# Patient Record
Sex: MALE | Race: WHITE | NOT HISPANIC OR LATINO | Employment: OTHER | URBAN - METROPOLITAN AREA
[De-identification: names, ages, dates, MRNs, and addresses within clinical notes are randomized per-mention and may not be internally consistent; named-entity substitution may affect disease eponyms.]

---

## 2017-07-05 ENCOUNTER — HOSPITAL ENCOUNTER (OUTPATIENT)
Dept: CARDIOLOGY | Facility: HOSPITAL | Age: 82
Discharge: HOME | End: 2017-07-05
Attending: SPECIALIST
Payer: MEDICARE

## 2017-07-05 DIAGNOSIS — I45.10 RIGHT BUNDLE BRANCH BLOCK: ICD-10-CM

## 2017-07-05 PROCEDURE — 93306 TTE W/DOPPLER COMPLETE: CPT

## 2017-07-13 LAB
ESTIMATED PA SYSTOLIC PRESSURE: 28.6
MITRAL VALVE REGURGITATION: NORMAL
RETIRED EF AND QEF - SEE NOTES: 65 (ref 55–65)
TRICUSPID VALVE REGURGITATION: NORMAL

## 2018-02-23 ENCOUNTER — OFFICE VISIT (OUTPATIENT)
Dept: URGENT CARE | Facility: CLINIC | Age: 83
End: 2018-02-23
Payer: MEDICARE

## 2018-02-23 VITALS
TEMPERATURE: 99 F | SYSTOLIC BLOOD PRESSURE: 141 MMHG | OXYGEN SATURATION: 96 % | BODY MASS INDEX: 22.44 KG/M2 | HEART RATE: 64 BPM | HEIGHT: 67 IN | RESPIRATION RATE: 16 BRPM | WEIGHT: 143 LBS | DIASTOLIC BLOOD PRESSURE: 61 MMHG

## 2018-02-23 DIAGNOSIS — H60.391 OTITIS, EXTERNA, INFECTIVE, RIGHT: Primary | ICD-10-CM

## 2018-02-23 DIAGNOSIS — J30.9 ALLERGIC RHINITIS, UNSPECIFIED CHRONICITY, UNSPECIFIED SEASONALITY, UNSPECIFIED TRIGGER: ICD-10-CM

## 2018-02-23 PROCEDURE — 99203 OFFICE O/P NEW LOW 30 MIN: CPT | Mod: 25,S$GLB,, | Performed by: PHYSICIAN ASSISTANT

## 2018-02-23 PROCEDURE — 1159F MED LIST DOCD IN RCRD: CPT | Mod: S$GLB,,, | Performed by: PHYSICIAN ASSISTANT

## 2018-02-23 PROCEDURE — 3008F BODY MASS INDEX DOCD: CPT | Mod: S$GLB,,, | Performed by: PHYSICIAN ASSISTANT

## 2018-02-23 PROCEDURE — 96372 THER/PROPH/DIAG INJ SC/IM: CPT | Mod: S$GLB,,, | Performed by: FAMILY MEDICINE

## 2018-02-23 RX ORDER — BETAMETHASONE SODIUM PHOSPHATE AND BETAMETHASONE ACETATE 3; 3 MG/ML; MG/ML
6 INJECTION, SUSPENSION INTRA-ARTICULAR; INTRALESIONAL; INTRAMUSCULAR; SOFT TISSUE ONCE
Status: COMPLETED | OUTPATIENT
Start: 2018-02-23 | End: 2018-02-23

## 2018-02-23 RX ORDER — NEOMYCIN SULFATE, POLYMYXIN B SULFATE AND HYDROCORTISONE 10; 3.5; 1 MG/ML; MG/ML; [USP'U]/ML
4 SUSPENSION/ DROPS AURICULAR (OTIC) 3 TIMES DAILY
Qty: 10 ML | Refills: 0 | Status: SHIPPED | OUTPATIENT
Start: 2018-02-23 | End: 2018-03-02

## 2018-02-23 RX ADMIN — BETAMETHASONE SODIUM PHOSPHATE AND BETAMETHASONE ACETATE 6 MG: 3; 3 INJECTION, SUSPENSION INTRA-ARTICULAR; INTRALESIONAL; INTRAMUSCULAR; SOFT TISSUE at 08:02

## 2018-02-24 NOTE — PROGRESS NOTES
"Subjective:       Patient ID: Shan Warner is a 86 y.o. male.    Vitals:  height is 5' 7" (1.702 m) and weight is 64.9 kg (143 lb). His temperature is 98.7 °F (37.1 °C). His blood pressure is 141/61 (abnormal) and his pulse is 64. His respiration is 16 and oxygen saturation is 96%.     Chief Complaint: Otalgia (right ear pain)    Otalgia    There is pain in the right ear. The current episode started yesterday. The problem has been gradually worsening. There has been no fever. Pertinent negatives include no abdominal pain, coughing, diarrhea, ear discharge, headaches, neck pain, rash, sore throat or vomiting. His past medical history is significant for hearing loss (chronic/wears hearing aids).     Review of Systems   Constitution: Negative for chills, fever and malaise/fatigue.   HENT: Positive for congestion (with chronic post nasal drip for months/years. Constant throat clearing reported.) and ear pain (right ear only). Negative for ear discharge, hoarse voice and sore throat.    Eyes: Negative for blurred vision, discharge, pain, redness and visual disturbance.   Cardiovascular: Negative for chest pain, dyspnea on exertion, leg swelling, near-syncope and syncope.   Respiratory: Negative for cough, shortness of breath, sputum production and wheezing.    Hematologic/Lymphatic: Negative for adenopathy.   Skin: Negative for itching and rash.   Musculoskeletal: Negative for back pain, myalgias, neck pain and stiffness.   Gastrointestinal: Negative for abdominal pain, diarrhea, nausea and vomiting.   Neurological: Negative for dizziness, headaches, light-headedness and numbness.   Psychiatric/Behavioral: Negative for altered mental status.   Allergic/Immunologic: Negative for hives.   All other systems reviewed and are negative.      Objective:      Physical Exam   Constitutional: He is oriented to person, place, and time. He appears well-developed and well-nourished.  Non-toxic appearance. He does not have a " sickly appearance. He does not appear ill. No distress.   HENT:   Head: Normocephalic and atraumatic.   Right Ear: Tympanic membrane and external ear normal. No lacerations. There is swelling (ear canal with mild erythema) and tenderness (with movement of pinna and speculum insertion). No drainage. No foreign bodies. No mastoid tenderness. Tympanic membrane is not injected, not perforated, not erythematous and not bulging. No middle ear effusion. No hemotympanum. Decreased hearing (chronic hearing loss) is noted.   Left Ear: Tympanic membrane, external ear and ear canal normal.   Nose: Nose normal. No epistaxis. Right sinus exhibits no maxillary sinus tenderness and no frontal sinus tenderness. Left sinus exhibits no maxillary sinus tenderness and no frontal sinus tenderness.   Mouth/Throat: Uvula is midline, oropharynx is clear and moist and mucous membranes are normal. No uvula swelling.   Bilateral clear nasal congestion without erythema. Clear post nasal drip.    Eyes: Pupils are equal, round, and reactive to light.   Neck: Normal range of motion. Neck supple.   Cardiovascular: Normal rate, regular rhythm and normal heart sounds.  Exam reveals no gallop and no friction rub.    No murmur heard.  Pulmonary/Chest: Effort normal and breath sounds normal. No respiratory distress. He has no decreased breath sounds. He has no wheezes. He has no rhonchi. He has no rales.   Musculoskeletal: Normal range of motion.   Lymphadenopathy:        Head (right side): Posterior auricular adenopathy present. No submental, no submandibular, no tonsillar, no preauricular and no occipital adenopathy present.        Head (left side): No submental, no submandibular, no tonsillar, no preauricular, no posterior auricular and no occipital adenopathy present.     He has no cervical adenopathy.        Right cervical: No posterior cervical adenopathy present.       Left cervical: No posterior cervical adenopathy present.        Right: No  supraclavicular adenopathy present.        Left: No supraclavicular adenopathy present.   Neurological: He is alert and oriented to person, place, and time. He is not disoriented. Coordination and gait normal.   Skin: No abrasion, no ecchymosis, no laceration and no rash noted. No erythema.   Psychiatric: He has a normal mood and affect. His behavior is normal.   Nursing note and vitals reviewed.      Assessment:       1. Otitis, externa, infective, right    2. Allergic rhinitis, unspecified chronicity, unspecified seasonality, unspecified trigger        Plan:         Otitis, externa, infective, right  -     neomycin-polymyxin-hydrocortisone (CORTISPORIN) 3.5-10,000-1 mg/mL-unit/mL-% otic suspension; Place 4 drops into the right ear 3 (three) times daily.  Dispense: 10 mL; Refill: 0    Allergic rhinitis, unspecified chronicity, unspecified seasonality, unspecified trigger  -     betamethasone acetate-betamethasone sodium phosphate injection 6 mg; Inject 1 mL (6 mg total) into the muscle once.    - Please return here or go to the Emergency Department for any concerns or worsening of condition.   - Follow up with PCP and get referral for Allergist/ENT for allergy testing and treatment to help with chronic post nasal drip and throat clearing.   - If you were prescribed antibiotics, please take them to completion.  - Please follow up with your primary care provider (PCP) or discussed specialist(s) as needed.

## 2019-03-24 ENCOUNTER — OFFICE VISIT (OUTPATIENT)
Dept: URGENT CARE | Facility: CLINIC | Age: 84
End: 2019-03-24
Payer: MEDICARE

## 2019-03-24 VITALS
OXYGEN SATURATION: 97 % | TEMPERATURE: 99 F | HEART RATE: 69 BPM | SYSTOLIC BLOOD PRESSURE: 131 MMHG | RESPIRATION RATE: 16 BRPM | DIASTOLIC BLOOD PRESSURE: 56 MMHG

## 2019-03-24 DIAGNOSIS — R07.81 RIB PAIN ON LEFT SIDE: Primary | ICD-10-CM

## 2019-03-24 PROCEDURE — 99214 OFFICE O/P EST MOD 30 MIN: CPT | Mod: S$GLB,,, | Performed by: FAMILY MEDICINE

## 2019-03-24 PROCEDURE — 71101 X-RAY EXAM UNILAT RIBS/CHEST: CPT | Mod: LT,S$GLB,, | Performed by: RADIOLOGY

## 2019-03-24 PROCEDURE — 99214 PR OFFICE/OUTPT VISIT, EST, LEVL IV, 30-39 MIN: ICD-10-PCS | Mod: S$GLB,,, | Performed by: FAMILY MEDICINE

## 2019-03-24 PROCEDURE — 71101 XR RIBS MIN 3 VIEWS W/ PA CHEST LEFT: ICD-10-PCS | Mod: LT,S$GLB,, | Performed by: RADIOLOGY

## 2019-03-24 RX ORDER — VALACYCLOVIR HYDROCHLORIDE 1 G/1
1000 TABLET, FILM COATED ORAL 3 TIMES DAILY
Qty: 21 TABLET | Refills: 0 | Status: SHIPPED | OUTPATIENT
Start: 2019-03-24 | End: 2020-05-03 | Stop reason: CLARIF

## 2019-03-24 RX ORDER — BENZONATATE 100 MG/1
100 CAPSULE ORAL EVERY 6 HOURS PRN
Qty: 30 CAPSULE | Refills: 1 | Status: SHIPPED | OUTPATIENT
Start: 2019-03-24 | End: 2020-03-23

## 2019-03-24 NOTE — PROGRESS NOTES
Subjective:       Patient ID: Shan Warner is a 87 y.o. male.    Vitals:  oral temperature is 98.5 °F (36.9 °C). His blood pressure is 131/56 (abnormal) and his pulse is 69. His respiration is 16 and oxygen saturation is 97%.     Chief Complaint: Chest Pain    Pt c/o left side rib pain, started about 4 days ago, worsening, no known injury, applied heat with no relief     Chest Pain    This is a new problem. The current episode started in the past 7 days. The onset quality is sudden. The problem occurs constantly. The problem has been unchanged. Pertinent negatives include no cough, dizziness, fever, headaches, nausea, shortness of breath or vomiting.       Constitution: Negative for chills, fatigue and fever.   HENT: Negative for congestion and sore throat.    Neck: Negative for painful lymph nodes.   Cardiovascular: Positive for chest pain (Rib). Negative for leg swelling.   Eyes: Negative for double vision and blurred vision.   Respiratory: Negative for cough and shortness of breath.    Gastrointestinal: Negative for nausea, vomiting and diarrhea.   Genitourinary: Negative for dysuria, frequency and urgency.   Musculoskeletal: Negative for joint pain, joint swelling, muscle cramps and muscle ache.   Skin: Negative for color change, pale and rash.   Allergic/Immunologic: Negative for seasonal allergies.   Neurological: Negative for dizziness, history of vertigo, light-headedness, passing out and headaches.   Hematologic/Lymphatic: Negative for swollen lymph nodes, easy bruising/bleeding and history of blood clots. Does not bruise/bleed easily.   Psychiatric/Behavioral: Negative for nervous/anxious, sleep disturbance and depression. The patient is not nervous/anxious.        Objective:      Physical Exam   Constitutional: He is oriented to person, place, and time. He appears well-developed and well-nourished. He is cooperative.  Non-toxic appearance. He does not appear ill. No distress.   HENT:   Head:  Normocephalic and atraumatic.   Right Ear: Hearing, tympanic membrane, external ear and ear canal normal.   Left Ear: Hearing, tympanic membrane, external ear and ear canal normal.   Nose: Nose normal. No mucosal edema, rhinorrhea or nasal deformity. No epistaxis. Right sinus exhibits no maxillary sinus tenderness and no frontal sinus tenderness. Left sinus exhibits no maxillary sinus tenderness and no frontal sinus tenderness.   Mouth/Throat: Uvula is midline, oropharynx is clear and moist and mucous membranes are normal. No trismus in the jaw. Normal dentition. No uvula swelling. No posterior oropharyngeal erythema.   Eyes: Conjunctivae and lids are normal. No scleral icterus.   Sclera clear bilat   Neck: Trachea normal, full passive range of motion without pain and phonation normal. Neck supple.   Cardiovascular: Normal rate, regular rhythm, normal heart sounds, intact distal pulses and normal pulses.   Pulmonary/Chest: Effort normal and breath sounds normal. No respiratory distress. He exhibits bony tenderness.       Abdominal: Normal appearance. He exhibits no distension. There is no tenderness.   Musculoskeletal: Normal range of motion. He exhibits no edema or deformity.   Neurological: He is alert and oriented to person, place, and time. He exhibits normal muscle tone. Coordination normal.   Skin: Skin is warm, dry and intact. He is not diaphoretic. No pallor.   Psychiatric: He has a normal mood and affect. His speech is normal and behavior is normal. Judgment and thought content normal. Cognition and memory are normal.   Nursing note and vitals reviewed.      Assessment:       1. Rib pain on left side        Plan:         Rib pain on left side  -     XR RIB LEFT W/ PA CHEST; Future; Expected date: 03/24/2019        pt states worse with coughing. No rash seen but d/w possibility of shingles and s/s to be aware of.   Expectant mgmt given. OTC treatment.    Xr Rib Left W/ Pa Chest    Result Date:  3/24/2019  EXAMINATION: XR RIBS MIN 3 VIEWS W/ PA CHEST LEFT CLINICAL HISTORY: Pleurodynia TECHNIQUE: Four views of the left ribs with PA chest COMPARISON: 02/19/2018 chest x-ray FINDINGS: No left rib fracture identified.  The cardiomediastinal silhouette is with normal limits.  No consolidation, pleural effusion, or pneumothorax.  Lung volumes are moderately low similar to prior exam.  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  There has been a cholecystectomy     No left rib fracture identified.  No acute cardiopulmonary process. Electronically signed by: Jn Ferguson MD Date:    03/24/2019 Time:    15:04

## 2019-03-24 NOTE — LETTER
March 24, 2019      [facility] [place] [address], [address]  [facility] [address]  Phone: [phone]  Fax: [phone]       Patient: Shan Warner   YOB: 1932  Date of Visit: 03/24/2019    To Whom It May Concern:    Sven Warner  was at [facility] on 03/24/2019.Please excuse for work/school for 3 days unless well enough to return sooner  . If you have any questions or concerns, or if I can be of further assistance, please do not hesitate to contact me.    Sincerely,    Robb Minor MD

## 2019-03-27 ENCOUNTER — TELEPHONE (OUTPATIENT)
Dept: URGENT CARE | Facility: CLINIC | Age: 84
End: 2019-03-27

## 2020-05-03 PROBLEM — I10 ESSENTIAL HYPERTENSION: Status: ACTIVE | Noted: 2020-05-03

## 2020-05-03 PROBLEM — E11.9 TYPE 2 DIABETES MELLITUS WITHOUT COMPLICATION, WITH LONG-TERM CURRENT USE OF INSULIN: Status: ACTIVE | Noted: 2020-05-03

## 2020-05-03 PROBLEM — R31.9 HEMATURIA: Status: ACTIVE | Noted: 2020-05-03

## 2020-05-03 PROBLEM — R00.0 SINUS TACHYCARDIA: Status: ACTIVE | Noted: 2020-05-03

## 2020-05-03 PROBLEM — R31.0 GROSS HEMATURIA: Status: ACTIVE | Noted: 2020-05-03

## 2020-05-03 PROBLEM — Z79.4 TYPE 2 DIABETES MELLITUS WITHOUT COMPLICATION, WITH LONG-TERM CURRENT USE OF INSULIN: Status: ACTIVE | Noted: 2020-05-03

## 2020-05-03 PROBLEM — N20.0 NEPHROLITHIASIS: Status: ACTIVE | Noted: 2020-05-03

## 2020-05-05 PROBLEM — N41.0 ACUTE PROSTATITIS: Status: ACTIVE | Noted: 2020-05-05

## 2020-07-16 ENCOUNTER — LAB VISIT (OUTPATIENT)
Dept: LAB | Facility: HOSPITAL | Age: 85
End: 2020-07-16
Attending: INTERNAL MEDICINE
Payer: MEDICARE

## 2020-07-16 DIAGNOSIS — R31.9 HEMATURIA, UNSPECIFIED TYPE: ICD-10-CM

## 2020-07-16 DIAGNOSIS — N41.0 ACUTE PROSTATITIS: ICD-10-CM

## 2020-07-16 LAB
ALBUMIN SERPL BCP-MCNC: 3.8 G/DL (ref 3.5–5.2)
ALP SERPL-CCNC: 72 U/L (ref 55–135)
ALT SERPL W/O P-5'-P-CCNC: 11 U/L (ref 10–44)
ANION GAP SERPL CALC-SCNC: 8 MMOL/L (ref 8–16)
AST SERPL-CCNC: 23 U/L (ref 10–40)
BASOPHILS NFR BLD: 0 % (ref 0–1.9)
BILIRUB SERPL-MCNC: 0.5 MG/DL (ref 0.1–1)
BUN SERPL-MCNC: 21 MG/DL (ref 8–23)
CALCIUM SERPL-MCNC: 9.9 MG/DL (ref 8.7–10.5)
CHLORIDE SERPL-SCNC: 110 MMOL/L (ref 95–110)
CO2 SERPL-SCNC: 22 MMOL/L (ref 23–29)
CREAT SERPL-MCNC: 1.1 MG/DL (ref 0.5–1.4)
DIFFERENTIAL METHOD: ABNORMAL
EOSINOPHIL NFR BLD: 1 % (ref 0–8)
ERYTHROCYTE [DISTWIDTH] IN BLOOD BY AUTOMATED COUNT: 18.6 % (ref 11.5–14.5)
EST. GFR  (AFRICAN AMERICAN): >60 ML/MIN/1.73 M^2
EST. GFR  (NON AFRICAN AMERICAN): 59.6 ML/MIN/1.73 M^2
GLUCOSE SERPL-MCNC: 161 MG/DL (ref 70–110)
HCT VFR BLD AUTO: 33.9 % (ref 40–54)
HGB BLD-MCNC: 9.9 G/DL (ref 14–18)
IMM GRANULOCYTES # BLD AUTO: ABNORMAL K/UL (ref 0–0.04)
IMM GRANULOCYTES NFR BLD AUTO: ABNORMAL % (ref 0–0.5)
LYMPHOCYTES NFR BLD: 20 % (ref 18–48)
MCH RBC QN AUTO: 27 PG (ref 27–31)
MCHC RBC AUTO-ENTMCNC: 29.2 G/DL (ref 32–36)
MCV RBC AUTO: 92 FL (ref 82–98)
MONOCYTES NFR BLD: 15 % (ref 4–15)
NEUTROPHILS NFR BLD: 61 % (ref 38–73)
NEUTS BAND NFR BLD MANUAL: 3 %
NRBC BLD-RTO: 0 /100 WBC
PLATELET # BLD AUTO: 106 K/UL (ref 150–350)
PMV BLD AUTO: 12.7 FL (ref 9.2–12.9)
POTASSIUM SERPL-SCNC: 4.4 MMOL/L (ref 3.5–5.1)
PROT SERPL-MCNC: 7.4 G/DL (ref 6–8.4)
RBC # BLD AUTO: 3.67 M/UL (ref 4.6–6.2)
SODIUM SERPL-SCNC: 140 MMOL/L (ref 136–145)
WBC # BLD AUTO: 14.99 K/UL (ref 3.9–12.7)

## 2020-07-16 PROCEDURE — 85007 BL SMEAR W/DIFF WBC COUNT: CPT

## 2020-07-16 PROCEDURE — 80053 COMPREHEN METABOLIC PANEL: CPT

## 2020-07-16 PROCEDURE — 36415 COLL VENOUS BLD VENIPUNCTURE: CPT | Mod: PN

## 2020-07-16 PROCEDURE — 85027 COMPLETE CBC AUTOMATED: CPT

## 2020-07-17 ENCOUNTER — LAB VISIT (OUTPATIENT)
Dept: LAB | Facility: HOSPITAL | Age: 85
End: 2020-07-17
Attending: INTERNAL MEDICINE
Payer: MEDICARE

## 2020-07-17 DIAGNOSIS — I10 ESSENTIAL (PRIMARY) HYPERTENSION: ICD-10-CM

## 2020-07-17 DIAGNOSIS — E78.2 MIXED HYPERLIPIDEMIA: ICD-10-CM

## 2020-07-17 DIAGNOSIS — R80.9 PROTEINURIA, UNSPECIFIED: ICD-10-CM

## 2020-07-17 DIAGNOSIS — E11.40 DIABETIC NEUROPATHY: ICD-10-CM

## 2020-07-17 DIAGNOSIS — R53.81 OTHER MALAISE: ICD-10-CM

## 2020-07-17 DIAGNOSIS — E03.9 HYPOTHYROIDISM, UNSPECIFIED: Primary | ICD-10-CM

## 2020-07-17 DIAGNOSIS — E11.9 TYPE 2 DIABETES MELLITUS WITHOUT COMPLICATIONS: ICD-10-CM

## 2020-07-17 LAB
ALBUMIN SERPL BCP-MCNC: 3.8 G/DL (ref 3.5–5.2)
ALP SERPL-CCNC: 74 U/L (ref 55–135)
ALT SERPL W/O P-5'-P-CCNC: 11 U/L (ref 10–44)
ANION GAP SERPL CALC-SCNC: 10 MMOL/L (ref 8–16)
AST SERPL-CCNC: 23 U/L (ref 10–40)
BASOPHILS # BLD AUTO: ABNORMAL K/UL (ref 0–0.2)
BASOPHILS NFR BLD: 0 % (ref 0–1.9)
BILIRUB SERPL-MCNC: 0.5 MG/DL (ref 0.1–1)
BUN SERPL-MCNC: 22 MG/DL (ref 8–23)
CALCIUM SERPL-MCNC: 10 MG/DL (ref 8.7–10.5)
CHLORIDE SERPL-SCNC: 111 MMOL/L (ref 95–110)
CHOLEST SERPL-MCNC: 121 MG/DL (ref 120–199)
CHOLEST/HDLC SERPL: 4.7 {RATIO} (ref 2–5)
CO2 SERPL-SCNC: 21 MMOL/L (ref 23–29)
CREAT SERPL-MCNC: 1 MG/DL (ref 0.5–1.4)
DIFFERENTIAL METHOD: ABNORMAL
EOSINOPHIL # BLD AUTO: ABNORMAL K/UL (ref 0–0.5)
EOSINOPHIL NFR BLD: 0 % (ref 0–8)
ERYTHROCYTE [DISTWIDTH] IN BLOOD BY AUTOMATED COUNT: 18.8 % (ref 11.5–14.5)
EST. GFR  (AFRICAN AMERICAN): >60 ML/MIN/1.73 M^2
EST. GFR  (NON AFRICAN AMERICAN): >60 ML/MIN/1.73 M^2
GLUCOSE SERPL-MCNC: 166 MG/DL (ref 70–110)
HCT VFR BLD AUTO: 33.8 % (ref 40–54)
HDLC SERPL-MCNC: 26 MG/DL (ref 40–75)
HDLC SERPL: 21.5 % (ref 20–50)
HGB BLD-MCNC: 10.1 G/DL (ref 14–18)
IMM GRANULOCYTES # BLD AUTO: ABNORMAL K/UL (ref 0–0.04)
IMM GRANULOCYTES NFR BLD AUTO: ABNORMAL % (ref 0–0.5)
LDLC SERPL CALC-MCNC: 73.6 MG/DL (ref 63–159)
LYMPHOCYTES # BLD AUTO: ABNORMAL K/UL (ref 1–4.8)
LYMPHOCYTES NFR BLD: 16 % (ref 18–48)
MCH RBC QN AUTO: 27.3 PG (ref 27–31)
MCHC RBC AUTO-ENTMCNC: 29.9 G/DL (ref 32–36)
MCV RBC AUTO: 91 FL (ref 82–98)
MONOCYTES # BLD AUTO: ABNORMAL K/UL (ref 0.3–1)
MONOCYTES NFR BLD: 13 % (ref 4–15)
NEUTROPHILS NFR BLD: 71 % (ref 38–73)
NONHDLC SERPL-MCNC: 95 MG/DL
NRBC BLD-RTO: 0 /100 WBC
PLATELET # BLD AUTO: 99 K/UL (ref 150–350)
PLATELET BLD QL SMEAR: ABNORMAL
PMV BLD AUTO: 12 FL (ref 9.2–12.9)
POTASSIUM SERPL-SCNC: 4.6 MMOL/L (ref 3.5–5.1)
PROT SERPL-MCNC: 7.2 G/DL (ref 6–8.4)
RBC # BLD AUTO: 3.7 M/UL (ref 4.6–6.2)
SODIUM SERPL-SCNC: 142 MMOL/L (ref 136–145)
T3FREE SERPL-MCNC: 2.8 PG/ML (ref 2.3–4.2)
T4 FREE SERPL-MCNC: 0.78 NG/DL (ref 0.71–1.51)
TRIGL SERPL-MCNC: 107 MG/DL (ref 30–150)
TSH SERPL DL<=0.005 MIU/L-ACNC: 3.02 UIU/ML (ref 0.4–4)
WBC # BLD AUTO: 16.03 K/UL (ref 3.9–12.7)

## 2020-07-17 PROCEDURE — 36415 COLL VENOUS BLD VENIPUNCTURE: CPT | Mod: PN

## 2020-07-17 PROCEDURE — 85007 BL SMEAR W/DIFF WBC COUNT: CPT

## 2020-07-17 PROCEDURE — 80053 COMPREHEN METABOLIC PANEL: CPT

## 2020-07-17 PROCEDURE — 84443 ASSAY THYROID STIM HORMONE: CPT

## 2020-07-17 PROCEDURE — 84481 FREE ASSAY (FT-3): CPT

## 2020-07-17 PROCEDURE — 80061 LIPID PANEL: CPT

## 2020-07-17 PROCEDURE — 83036 HEMOGLOBIN GLYCOSYLATED A1C: CPT

## 2020-07-17 PROCEDURE — 85027 COMPLETE CBC AUTOMATED: CPT

## 2020-07-17 PROCEDURE — 84439 ASSAY OF FREE THYROXINE: CPT

## 2020-07-17 PROCEDURE — 82043 UR ALBUMIN QUANTITATIVE: CPT

## 2020-07-18 LAB
ALBUMIN/CREAT UR: 172.2 UG/MG (ref 0–30)
CREAT UR-MCNC: 54 MG/DL (ref 23–375)
ESTIMATED AVG GLUCOSE: 143 MG/DL (ref 68–131)
HBA1C MFR BLD HPLC: 6.6 % (ref 4–5.6)
MICROALBUMIN UR DL<=1MG/L-MCNC: 93 UG/ML

## 2020-08-12 ENCOUNTER — OFFICE VISIT (OUTPATIENT)
Dept: PODIATRY | Facility: CLINIC | Age: 85
End: 2020-08-12
Payer: MEDICARE

## 2020-08-12 VITALS — BODY MASS INDEX: 19.88 KG/M2 | HEIGHT: 68 IN | WEIGHT: 131.19 LBS

## 2020-08-12 DIAGNOSIS — E11.49 TYPE II DIABETES MELLITUS WITH NEUROLOGICAL MANIFESTATIONS: Primary | ICD-10-CM

## 2020-08-12 DIAGNOSIS — B35.1 ONYCHOMYCOSIS DUE TO DERMATOPHYTE: ICD-10-CM

## 2020-08-12 PROCEDURE — 99203 OFFICE O/P NEW LOW 30 MIN: CPT | Mod: S$GLB,,, | Performed by: PODIATRIST

## 2020-08-12 PROCEDURE — 1126F AMNT PAIN NOTED NONE PRSNT: CPT | Mod: S$GLB,,, | Performed by: PODIATRIST

## 2020-08-12 PROCEDURE — 1126F PR PAIN SEVERITY QUANTIFIED, NO PAIN PRESENT: ICD-10-PCS | Mod: S$GLB,,, | Performed by: PODIATRIST

## 2020-08-12 PROCEDURE — 99999 PR PBB SHADOW E&M-EST. PATIENT-LVL IV: CPT | Mod: PBBFAC,,, | Performed by: PODIATRIST

## 2020-08-12 PROCEDURE — 1101F PR PT FALLS ASSESS DOC 0-1 FALLS W/OUT INJ PAST YR: ICD-10-PCS | Mod: CPTII,S$GLB,, | Performed by: PODIATRIST

## 2020-08-12 PROCEDURE — 1101F PT FALLS ASSESS-DOCD LE1/YR: CPT | Mod: CPTII,S$GLB,, | Performed by: PODIATRIST

## 2020-08-12 PROCEDURE — 99999 PR PBB SHADOW E&M-EST. PATIENT-LVL IV: ICD-10-PCS | Mod: PBBFAC,,, | Performed by: PODIATRIST

## 2020-08-12 PROCEDURE — 1159F MED LIST DOCD IN RCRD: CPT | Mod: S$GLB,,, | Performed by: PODIATRIST

## 2020-08-12 PROCEDURE — 1159F PR MEDICATION LIST DOCUMENTED IN MEDICAL RECORD: ICD-10-PCS | Mod: S$GLB,,, | Performed by: PODIATRIST

## 2020-08-12 PROCEDURE — 99203 PR OFFICE/OUTPT VISIT, NEW, LEVL III, 30-44 MIN: ICD-10-PCS | Mod: S$GLB,,, | Performed by: PODIATRIST

## 2020-08-12 RX ORDER — ACETAMINOPHEN, DIPHENHYDRAMINE HCL, PHENYLEPHRINE HCL 325; 25; 5 MG/1; MG/1; MG/1
1 TABLET ORAL DAILY
COMMUNITY
End: 2023-01-26 | Stop reason: CLARIF

## 2020-08-12 NOTE — PROGRESS NOTES
"Subjective:      Patient ID: Shan Warner is a 88 y.o. male.    Chief Complaint: Diabetic Foot Exam (PCP:  Dr Mari  July 2020; HgbA1c:  7/17/20  6.6) and Nail Problem ("fungus")    Shan TELLEZ is a 88 y.o. male who presents to the clinic upon referral from Dr. Sweet  for evaluation and treatment of diabetic feet. Shan TELLEZ has a past medical history of Arthritis, Dementia, Diabetes mellitus, Essential hypertension (5/3/2020), HEARING LOSS, Leukemia, and Rash. Patient relates no major problem with feet. Only complaints today consist of thick and discolored nails has tried some topical medications but seems to help a bit but comes right back when he stops using them..    PCP: Isma Mari MD    Date Last Seen by PCP: July 2020    Current shoe gear: Casual shoes    Hemoglobin A1C   Date Value Ref Range Status   07/17/2020 6.6 (H) 4.0 - 5.6 % Final     Comment:     ADA Screening Guidelines:  5.7-6.4%  Consistent with prediabetes  >or=6.5%  Consistent with diabetes  High levels of fetal hemoglobin interfere with the HbA1C  assay. Heterozygous hemoglobin variants (HbS, HgC, etc)do  not significantly interfere with this assay.   However, presence of multiple variants may affect accuracy.     08/14/2018 6.5 (H) 0.0 - 5.6 % Final     Comment:     Reference Interval:  5.0 - 5.6 Normal   5.7 - 6.4 High Risk   > 6.5 Diabetic    Hgb A1c results are standardized based on the (NGSP) National   Glycohemoglobin Standardization Program.    Hemoglobin A1C levels are related to mean serum/plasma glucose   during the preceding 2-3 months.                Review of Systems   Constitution: Negative for chills and fever.   Cardiovascular: Negative for claudication and leg swelling.   Respiratory: Negative for shortness of breath.    Skin: Positive for nail changes. Negative for itching and rash.   Musculoskeletal: Positive for arthritis. Negative for muscle cramps, muscle weakness and myalgias.   Gastrointestinal: Negative for " nausea and vomiting.   Neurological: Positive for paresthesias. Negative for focal weakness, loss of balance and numbness.           Objective:      Physical Exam  Constitutional:       General: He is not in acute distress.     Appearance: He is well-developed. He is not diaphoretic.   Cardiovascular:      Pulses:           Dorsalis pedis pulses are 2+ on the right side and 2+ on the left side.        Posterior tibial pulses are 2+ on the right side and 2+ on the left side.      Comments: < 3 sec capillary refill time to toes 1-5 bilateral. Toes and feet are warm to touch proximally with normal distal cooling b/l. There is some hair growth on the feet and toes b/l. There is no edema b/l. No spider veins or varicosities present b/l.     Musculoskeletal:      Comments: Equinus noted b/l ankles with < 10 deg DF noted. MMT 5/5 in DF/PF/Inv/Ev resistance with no reproduction of pain in any direction. Passive range of motion of ankle and pedal joints is painless b/l.     Feet:      Right foot:      Protective Sensation: 10 sites tested. 4 sites sensed.      Toenail Condition: Right toenails are abnormally thick and long. Fungal disease present.     Left foot:      Protective Sensation: 10 sites tested. 6 sites sensed.      Toenail Condition: Left toenails are abnormally thick and long. Fungal disease present.     Comments: Nails 1-5 bilateral are thick 3-4 mm, long 3-6 mm, and discolored with subungual debris    Skin:     General: Skin is warm and dry.      Coloration: Skin is not pale.      Findings: No abrasion, bruising, burn, ecchymosis, erythema, laceration, lesion, petechiae or rash.      Nails: There is no clubbing.        Comments: Skin temperature, texture and turgor within normal limits.   Neurological:      Mental Status: He is alert and oriented to person, place, and time.      Sensory: Sensory deficit present.      Motor: No tremor, atrophy or abnormal muscle tone.      Comments: Negative tinel sign  bilateral. Absent vibratory sensation bilateral   Psychiatric:         Behavior: Behavior normal.               Assessment:       Encounter Diagnoses   Name Primary?    Type II diabetes mellitus with neurological manifestations Yes    Onychomycosis due to dermatophyte          Plan:       Shan TELLEZ was seen today for diabetic foot exam and nail problem.    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    - Shoe inspection. Diabetic Foot Education. Patient reminded of the importance of good nutrition and blood sugar control to help prevent podiatric complications of diabetes. Patient instructed on proper foot hygeine. We discussed wearing proper shoe gear, daily foot inspections, never walking without protective shoe gear.    - With patient's permission and out of courtesy, nails were aggressively reduced and debrided x 10 to their soft tissue attachment mechanically and with electric , removing all offending nail and debris. Patient relates relief following the procedure. He will continue to monitor the areas daily, inspect his feet, wear protective shoe gear when ambulatory, moisturizer to maintain skin integrity and follow in this office in approximately 2-3 months, sooner p.r.n.      Prescribed CMPD Voriconazole 2.67%, Vancomycin 6.67% Nail Suspension in DMSO, apply twice daily to affected nails    Return in 1 year diabetic foot check up

## 2020-08-13 ENCOUNTER — TELEPHONE (OUTPATIENT)
Dept: PODIATRY | Facility: CLINIC | Age: 85
End: 2020-08-13

## 2020-08-13 NOTE — TELEPHONE ENCOUNTER
----- Message from Rafa Schmitz sent at 8/13/2020  8:57 AM CDT -----  Type:  Pharmacy Calling to Clarify an RX    Name of Caller:  Kaela  Pharmacy Name:    [facility]      Prescription Name:  Compound--Voriconazole/Vancomycin  What do they need to clarify?:  Not covered by patient's insurance-Needs an alternative  Best Call Back Number:  [phone]  Additional Information:

## 2020-08-13 NOTE — TELEPHONE ENCOUNTER
Wants to know if the RX can be changed to a powder - Doxy/cipro/ketoconazole mix .  Stated the other is not covered under his insurance.  Please advise.

## 2020-09-03 ENCOUNTER — LAB VISIT (OUTPATIENT)
Dept: LAB | Facility: HOSPITAL | Age: 85
End: 2020-09-03
Attending: SURGERY
Payer: MEDICARE

## 2020-09-03 DIAGNOSIS — I43 DILATED CARDIOMYOPATHY SECONDARY TO ELECTROLYTE DEFICIENCY: ICD-10-CM

## 2020-09-03 DIAGNOSIS — I10 ESSENTIAL HYPERTENSION, MALIGNANT: ICD-10-CM

## 2020-09-03 DIAGNOSIS — I51.9 MYXEDEMA HEART DISEASE: ICD-10-CM

## 2020-09-03 DIAGNOSIS — E87.8 DILATED CARDIOMYOPATHY SECONDARY TO ELECTROLYTE DEFICIENCY: ICD-10-CM

## 2020-09-03 DIAGNOSIS — E03.9 MYXEDEMA HEART DISEASE: ICD-10-CM

## 2020-09-03 DIAGNOSIS — K21.00 REFLUX ESOPHAGITIS: ICD-10-CM

## 2020-09-03 DIAGNOSIS — M19.90 SENILE ARTHRITIS: Primary | ICD-10-CM

## 2020-09-03 DIAGNOSIS — I25.9 CHRONIC ISCHEMIC HEART DISEASE, UNSPECIFIED: ICD-10-CM

## 2020-09-03 DIAGNOSIS — E87.5 HYPERPOTASSEMIA: ICD-10-CM

## 2020-09-03 LAB
ALBUMIN SERPL BCP-MCNC: 3.9 G/DL (ref 3.5–5.2)
ALBUMIN SERPL BCP-MCNC: 3.9 G/DL (ref 3.5–5.2)
ALP SERPL-CCNC: 74 U/L (ref 55–135)
ALP SERPL-CCNC: 74 U/L (ref 55–135)
ALT SERPL W/O P-5'-P-CCNC: 12 U/L (ref 10–44)
ALT SERPL W/O P-5'-P-CCNC: 12 U/L (ref 10–44)
ANION GAP SERPL CALC-SCNC: 9 MMOL/L (ref 8–16)
AST SERPL-CCNC: 20 U/L (ref 10–40)
AST SERPL-CCNC: 20 U/L (ref 10–40)
BILIRUB DIRECT SERPL-MCNC: 0.3 MG/DL (ref 0.1–0.3)
BILIRUB SERPL-MCNC: 0.6 MG/DL (ref 0.1–1)
BILIRUB SERPL-MCNC: 0.6 MG/DL (ref 0.1–1)
BUN SERPL-MCNC: 21 MG/DL (ref 8–23)
CALCIUM SERPL-MCNC: 9.8 MG/DL (ref 8.7–10.5)
CHLORIDE SERPL-SCNC: 111 MMOL/L (ref 95–110)
CHOLEST SERPL-MCNC: 113 MG/DL (ref 120–199)
CHOLEST/HDLC SERPL: 4.5 {RATIO} (ref 2–5)
CO2 SERPL-SCNC: 21 MMOL/L (ref 23–29)
COMPLEXED PSA SERPL-MCNC: 1.7 NG/ML (ref 0–4)
CREAT SERPL-MCNC: 1.1 MG/DL (ref 0.5–1.4)
EST. GFR  (AFRICAN AMERICAN): >60 ML/MIN/1.73 M^2
EST. GFR  (NON AFRICAN AMERICAN): 59.6 ML/MIN/1.73 M^2
ESTIMATED AVG GLUCOSE: 134 MG/DL (ref 68–131)
GLUCOSE SERPL-MCNC: 171 MG/DL (ref 70–110)
HBA1C MFR BLD HPLC: 6.3 % (ref 4–5.6)
HDLC SERPL-MCNC: 25 MG/DL (ref 40–75)
HDLC SERPL: 22.1 % (ref 20–50)
LDLC SERPL CALC-MCNC: 62.4 MG/DL (ref 63–159)
NONHDLC SERPL-MCNC: 88 MG/DL
POTASSIUM SERPL-SCNC: 4.1 MMOL/L (ref 3.5–5.1)
PROT SERPL-MCNC: 7.1 G/DL (ref 6–8.4)
PROT SERPL-MCNC: 7.1 G/DL (ref 6–8.4)
SODIUM SERPL-SCNC: 141 MMOL/L (ref 136–145)
T4 FREE SERPL-MCNC: 0.8 NG/DL (ref 0.71–1.51)
TRIGL SERPL-MCNC: 128 MG/DL (ref 30–150)
TSH SERPL DL<=0.005 MIU/L-ACNC: 3.13 UIU/ML (ref 0.4–4)
URATE SERPL-MCNC: 4.4 MG/DL (ref 3.4–7)

## 2020-09-03 PROCEDURE — 36415 COLL VENOUS BLD VENIPUNCTURE: CPT | Mod: PN

## 2020-09-03 PROCEDURE — 86038 ANTINUCLEAR ANTIBODIES: CPT

## 2020-09-03 PROCEDURE — 85027 COMPLETE CBC AUTOMATED: CPT

## 2020-09-03 PROCEDURE — 80053 COMPREHEN METABOLIC PANEL: CPT

## 2020-09-03 PROCEDURE — 86431 RHEUMATOID FACTOR QUANT: CPT

## 2020-09-03 PROCEDURE — 84153 ASSAY OF PSA TOTAL: CPT

## 2020-09-03 PROCEDURE — 80061 LIPID PANEL: CPT

## 2020-09-03 PROCEDURE — 83036 HEMOGLOBIN GLYCOSYLATED A1C: CPT

## 2020-09-03 PROCEDURE — 84550 ASSAY OF BLOOD/URIC ACID: CPT

## 2020-09-03 PROCEDURE — 84443 ASSAY THYROID STIM HORMONE: CPT

## 2020-09-03 PROCEDURE — 84439 ASSAY OF FREE THYROXINE: CPT

## 2020-09-03 PROCEDURE — 85007 BL SMEAR W/DIFF WBC COUNT: CPT

## 2020-09-04 LAB
ANISOCYTOSIS BLD QL SMEAR: SLIGHT
BASOPHILS # BLD AUTO: ABNORMAL K/UL (ref 0–0.2)
BASOPHILS NFR BLD: 0 % (ref 0–1.9)
BURR CELLS BLD QL SMEAR: ABNORMAL
DIFFERENTIAL METHOD: ABNORMAL
EOSINOPHIL # BLD AUTO: ABNORMAL K/UL (ref 0–0.5)
EOSINOPHIL NFR BLD: 3 % (ref 0–8)
ERYTHROCYTE [DISTWIDTH] IN BLOOD BY AUTOMATED COUNT: 19 % (ref 11.5–14.5)
GIANT PLATELETS BLD QL SMEAR: PRESENT
HCT VFR BLD AUTO: 36.1 % (ref 40–54)
HGB BLD-MCNC: 11 G/DL (ref 14–18)
HYPOCHROMIA BLD QL SMEAR: ABNORMAL
IMM GRANULOCYTES # BLD AUTO: ABNORMAL K/UL (ref 0–0.04)
IMM GRANULOCYTES NFR BLD AUTO: ABNORMAL % (ref 0–0.5)
LYMPHOCYTES # BLD AUTO: ABNORMAL K/UL (ref 1–4.8)
LYMPHOCYTES NFR BLD: 13 % (ref 18–48)
MCH RBC QN AUTO: 28.8 PG (ref 27–31)
MCHC RBC AUTO-ENTMCNC: 30.5 G/DL (ref 32–36)
MCV RBC AUTO: 95 FL (ref 82–98)
METAMYELOCYTES NFR BLD MANUAL: 6 %
MONOCYTES # BLD AUTO: ABNORMAL K/UL (ref 0.3–1)
MONOCYTES NFR BLD: 16 % (ref 4–15)
MYELOCYTES NFR BLD MANUAL: 1 %
NEUTROPHILS NFR BLD: 56 % (ref 38–73)
NEUTS BAND NFR BLD MANUAL: 5 %
NRBC BLD-RTO: 0 /100 WBC
OVALOCYTES BLD QL SMEAR: ABNORMAL
PLATELET # BLD AUTO: 98 K/UL (ref 150–350)
PLATELET BLD QL SMEAR: ABNORMAL
PMV BLD AUTO: 12.4 FL (ref 9.2–12.9)
POIKILOCYTOSIS BLD QL SMEAR: SLIGHT
POLYCHROMASIA BLD QL SMEAR: ABNORMAL
RBC # BLD AUTO: 3.82 M/UL (ref 4.6–6.2)
RHEUMATOID FACT SERPL-ACNC: <10 IU/ML (ref 0–15)
WBC # BLD AUTO: 13.36 K/UL (ref 3.9–12.7)

## 2020-09-08 LAB — ANA SER QL IF: NORMAL

## 2021-03-12 ENCOUNTER — OFFICE VISIT (OUTPATIENT)
Dept: PODIATRY | Facility: CLINIC | Age: 86
End: 2021-03-12
Payer: OTHER GOVERNMENT

## 2021-03-12 VITALS
HEART RATE: 90 BPM | SYSTOLIC BLOOD PRESSURE: 116 MMHG | BODY MASS INDEX: 19.7 KG/M2 | OXYGEN SATURATION: 99 % | WEIGHT: 130 LBS | HEIGHT: 68 IN | RESPIRATION RATE: 16 BRPM | DIASTOLIC BLOOD PRESSURE: 70 MMHG

## 2021-03-12 DIAGNOSIS — L97.511 ULCER OF RIGHT FOOT, LIMITED TO BREAKDOWN OF SKIN: ICD-10-CM

## 2021-03-12 DIAGNOSIS — E11.49 TYPE II DIABETES MELLITUS WITH NEUROLOGICAL MANIFESTATIONS: Primary | ICD-10-CM

## 2021-03-12 DIAGNOSIS — B35.1 ONYCHOMYCOSIS DUE TO DERMATOPHYTE: ICD-10-CM

## 2021-03-12 PROCEDURE — 99213 PR OFFICE/OUTPT VISIT, EST, LEVL III, 20-29 MIN: ICD-10-PCS | Mod: S$GLB,,, | Performed by: PODIATRIST

## 2021-03-12 PROCEDURE — 99213 OFFICE O/P EST LOW 20 MIN: CPT | Mod: S$GLB,,, | Performed by: PODIATRIST

## 2021-03-12 RX ORDER — INSULIN GLARGINE 100 [IU]/ML
10 INJECTION, SOLUTION SUBCUTANEOUS NIGHTLY
COMMUNITY
End: 2021-04-24 | Stop reason: CLARIF

## 2021-03-12 RX ORDER — INSULIN LISPRO 100 [IU]/ML
INJECTION, SOLUTION INTRAVENOUS; SUBCUTANEOUS
COMMUNITY
End: 2021-04-24 | Stop reason: CLARIF

## 2021-03-19 ENCOUNTER — TELEPHONE (OUTPATIENT)
Dept: FAMILY MEDICINE | Facility: CLINIC | Age: 86
End: 2021-03-19

## 2021-04-24 PROBLEM — K62.5 RECTAL BLEEDING: Status: ACTIVE | Noted: 2021-04-24

## 2021-04-28 PROBLEM — K62.5 RECTAL BLEEDING: Status: RESOLVED | Noted: 2021-04-24 | Resolved: 2021-04-28

## 2021-04-28 PROBLEM — D64.9 SYMPTOMATIC ANEMIA: Status: ACTIVE | Noted: 2021-04-28

## 2021-05-10 ENCOUNTER — PATIENT MESSAGE (OUTPATIENT)
Dept: RESEARCH | Facility: HOSPITAL | Age: 86
End: 2021-05-10

## 2022-01-03 ENCOUNTER — TELEPHONE (OUTPATIENT)
Dept: CARDIOLOGY | Facility: HOSPITAL | Age: 87
End: 2022-01-03
Payer: OTHER GOVERNMENT

## 2022-01-03 ENCOUNTER — DOCUMENTATION ONLY (OUTPATIENT)
Dept: CARDIOLOGY | Facility: HOSPITAL | Age: 87
End: 2022-01-03
Payer: OTHER GOVERNMENT

## 2022-01-03 ENCOUNTER — TELEPHONE (OUTPATIENT)
Dept: CARDIOLOGY | Facility: CLINIC | Age: 87
End: 2022-01-03
Payer: OTHER GOVERNMENT

## 2022-01-03 DIAGNOSIS — Z95.0 CARDIAC PACEMAKER IN SITU: Primary | ICD-10-CM

## 2022-01-03 DIAGNOSIS — I44.2 AV BLOCK, COMPLETE: ICD-10-CM

## 2022-01-03 NOTE — TELEPHONE ENCOUNTER
Spoke with pt's wife.  She agreed on date and time for pt f/u in clinic.  Pt not sure if he is going to be following here or with the pt's wife's cardiologist.  Pt will let us know during f/u appt.

## 2022-01-03 NOTE — TELEPHONE ENCOUNTER
----- Message from Matilde Mac sent at 1/3/2022  8:13 AM CST -----    ----- Message -----  From: Isaiah Villagomez MD  Sent: 1/1/2022  11:28 AM CST  To: Matilde Cooper just put in this device On 12/31/21, please enroll in device clinic

## 2022-01-03 NOTE — TELEPHONE ENCOUNTER
SPOKE W/ PT, HE IS SCHEDULED TO COME IN 1/10/22    ----- Message from Radha Calixto LPN sent at 1/3/2022  9:45 AM CST -----    ----- Message -----  From: Isaiah Villagomez MD  Sent: 1/1/2022  11:28 AM CST  To: Jasmin BENJAMIN Staff    Please arrange follow up with Dr. Cooper at patient's request    -Isaiah

## 2022-01-04 ENCOUNTER — TELEPHONE (OUTPATIENT)
Dept: CARDIOLOGY | Facility: CLINIC | Age: 87
End: 2022-01-04
Payer: OTHER GOVERNMENT

## 2022-01-04 NOTE — TELEPHONE ENCOUNTER
----- Message from Rafa Schmitz sent at 1/4/2022  3:13 PM CST -----  Type: Needs Medical Advice  Who Called:  Grace Warner (Spouse  Symptoms (please be specific):  Dark stool-Possible blood  How long has patient had these symptoms:  Since surgery    Best Call Back Number: [phone]  Additional Information: Please call to advise

## 2022-01-04 NOTE — TELEPHONE ENCOUNTER
Spoke to wife and advised to contact PCP re dark stools and high blood sugars. Pt will be seen by Dr Cooper on 1/10 for SOB

## 2022-01-05 ENCOUNTER — HOSPITAL ENCOUNTER (OUTPATIENT)
Dept: RADIOLOGY | Facility: HOSPITAL | Age: 87
Discharge: HOME | End: 2022-01-05
Attending: INTERNAL MEDICINE
Payer: MEDICARE

## 2022-01-05 ENCOUNTER — OFFICE VISIT (OUTPATIENT)
Dept: CARDIOLOGY | Facility: CLINIC | Age: 87
End: 2022-01-05
Payer: MEDICARE

## 2022-01-05 ENCOUNTER — DOCUMENTATION ONLY (OUTPATIENT)
Dept: CARDIOLOGY | Facility: HOSPITAL | Age: 87
End: 2022-01-05
Payer: OTHER GOVERNMENT

## 2022-01-05 VITALS
HEART RATE: 98 BPM | HEIGHT: 66 IN | DIASTOLIC BLOOD PRESSURE: 59 MMHG | WEIGHT: 134.5 LBS | SYSTOLIC BLOOD PRESSURE: 118 MMHG | BODY MASS INDEX: 21.62 KG/M2

## 2022-01-05 DIAGNOSIS — E08.22 DIABETES MELLITUS DUE TO UNDERLYING CONDITION WITH DIABETIC CHRONIC KIDNEY DISEASE: ICD-10-CM

## 2022-01-05 DIAGNOSIS — I10 ESSENTIAL HYPERTENSION: ICD-10-CM

## 2022-01-05 DIAGNOSIS — R06.02 SOB (SHORTNESS OF BREATH): ICD-10-CM

## 2022-01-05 DIAGNOSIS — I44.2 AV BLOCK, COMPLETE: ICD-10-CM

## 2022-01-05 DIAGNOSIS — I10 ESSENTIAL HYPERTENSION: Primary | ICD-10-CM

## 2022-01-05 PROCEDURE — 99999 PR PBB SHADOW E&M-EST. PATIENT-LVL V: ICD-10-PCS | Mod: PBBFAC,,, | Performed by: INTERNAL MEDICINE

## 2022-01-05 PROCEDURE — 71046 X-RAY EXAM CHEST 2 VIEWS: CPT | Mod: 26,,, | Performed by: RADIOLOGY

## 2022-01-05 PROCEDURE — 1159F MED LIST DOCD IN RCRD: CPT | Mod: CPTII,S$GLB,, | Performed by: INTERNAL MEDICINE

## 2022-01-05 PROCEDURE — 99024 PR POST-OP FOLLOW-UP VISIT: ICD-10-PCS | Mod: S$GLB,,, | Performed by: INTERNAL MEDICINE

## 2022-01-05 PROCEDURE — 99024 POSTOP FOLLOW-UP VISIT: CPT | Mod: S$GLB,,, | Performed by: INTERNAL MEDICINE

## 2022-01-05 PROCEDURE — 1111F PR DISCHARGE MEDS RECONCILED W/ CURRENT OUTPATIENT MED LIST: ICD-10-PCS | Mod: CPTII,S$GLB,, | Performed by: INTERNAL MEDICINE

## 2022-01-05 PROCEDURE — 71046 XR CHEST PA AND LATERAL: ICD-10-PCS | Mod: 26,,, | Performed by: RADIOLOGY

## 2022-01-05 PROCEDURE — 1160F RVW MEDS BY RX/DR IN RCRD: CPT | Mod: CPTII,S$GLB,, | Performed by: INTERNAL MEDICINE

## 2022-01-05 PROCEDURE — 1126F PR PAIN SEVERITY QUANTIFIED, NO PAIN PRESENT: ICD-10-PCS | Mod: CPTII,S$GLB,, | Performed by: INTERNAL MEDICINE

## 2022-01-05 PROCEDURE — 1160F PR REVIEW ALL MEDS BY PRESCRIBER/CLIN PHARMACIST DOCUMENTED: ICD-10-PCS | Mod: CPTII,S$GLB,, | Performed by: INTERNAL MEDICINE

## 2022-01-05 PROCEDURE — 1126F AMNT PAIN NOTED NONE PRSNT: CPT | Mod: CPTII,S$GLB,, | Performed by: INTERNAL MEDICINE

## 2022-01-05 PROCEDURE — 1159F PR MEDICATION LIST DOCUMENTED IN MEDICAL RECORD: ICD-10-PCS | Mod: CPTII,S$GLB,, | Performed by: INTERNAL MEDICINE

## 2022-01-05 PROCEDURE — 71046 X-RAY EXAM CHEST 2 VIEWS: CPT | Mod: TC,FY,PO

## 2022-01-05 PROCEDURE — 99999 PR PBB SHADOW E&M-EST. PATIENT-LVL V: CPT | Mod: PBBFAC,,, | Performed by: INTERNAL MEDICINE

## 2022-01-05 PROCEDURE — 1111F DSCHRG MED/CURRENT MED MERGE: CPT | Mod: CPTII,S$GLB,, | Performed by: INTERNAL MEDICINE

## 2022-01-05 NOTE — PROGRESS NOTES
Wound assessed per Dr. Castellanos request  Implant 12/31/21  Incision WNL, No s/s of infection, staples intact, minimal swelling noted, bruising noted  Cleansed with wound cleanser, applied guaze and surgical tape  Reviewed wound care and arm restrictions  VU  Appt scheduled 1/10/22 for device f/u post implant

## 2022-01-05 NOTE — PROGRESS NOTES
Subjective:    Patient ID:  Shan Warner is a 89 y.o. male who presents for follow-up of Shortness of Breath and Edema      HPI  Had PPM last week due to advanced heart block, near syncope and SOB  He comes for follow up with mild left hand swelling, persistent SOB with minimal activity, no chest pain  Blood sugars all over the place      Review of Systems   Constitutional: Negative for decreased appetite, malaise/fatigue, weight gain and weight loss.   Cardiovascular: Positive for dyspnea on exertion. Negative for chest pain, leg swelling, palpitations and syncope.   Respiratory: Positive for shortness of breath. Negative for cough.    Gastrointestinal: Negative.    Neurological: Negative for weakness.   All other systems reviewed and are negative.       Objective:      Physical Exam  Vitals and nursing note reviewed.   Constitutional:       Appearance: Normal appearance. He is well-developed and well-nourished.   HENT:      Head: Normocephalic.   Eyes:      Pupils: Pupils are equal, round, and reactive to light.   Neck:      Thyroid: No thyromegaly.      Vascular: No carotid bruit or JVD.   Cardiovascular:      Rate and Rhythm: Normal rate and regular rhythm.      Chest Wall: PMI is not displaced.      Pulses: Normal pulses and intact distal pulses.      Heart sounds: Normal heart sounds. No murmur heard.  No gallop.    Pulmonary:      Effort: Pulmonary effort is normal.      Breath sounds: Normal breath sounds.   Chest:       Abdominal:      Palpations: Abdomen is soft. There is no hepatosplenomegaly or mass.      Tenderness: There is no abdominal tenderness.   Musculoskeletal:         General: No edema. Normal range of motion.      Cervical back: Normal range of motion and neck supple.   Skin:     General: Skin is warm and intact.   Neurological:      Mental Status: He is alert and oriented to person, place, and time.      Sensory: No sensory deficit.      Deep Tendon Reflexes: Strength normal and reflexes  are normal and symmetric.   Psychiatric:         Mood and Affect: Mood and affect normal.           Assessment:       1. Essential hypertension    2. AV block, complete    3. SOB (shortness of breath)    4. 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

## 2022-01-06 ENCOUNTER — TELEPHONE (OUTPATIENT)
Dept: CARDIOLOGY | Facility: CLINIC | Age: 87
End: 2022-01-06
Payer: OTHER GOVERNMENT

## 2022-01-06 NOTE — TELEPHONE ENCOUNTER
LABS AND CXR FAXED TO [facility] @ [phone] / PT REQUEST    ----- Message from Rupa Pérez sent at 1/6/2022  8:36 AM CST -----  Type: Needs Medical Advice  Who Called:  Pt wife  Best Call Back Number: [phone]  Additional Information: Pt wife requesting pt to be on wait list if possible-pt wife also requesting [facility]--please advise--Thank you

## 2022-01-10 ENCOUNTER — CLINICAL SUPPORT (OUTPATIENT)
Dept: CARDIOLOGY | Facility: HOSPITAL | Age: 87
End: 2022-01-10
Attending: INTERNAL MEDICINE
Payer: MEDICARE

## 2022-01-10 DIAGNOSIS — Z95.0 CARDIAC PACEMAKER IN SITU: ICD-10-CM

## 2022-01-10 DIAGNOSIS — R06.02 SOB (SHORTNESS OF BREATH): ICD-10-CM

## 2022-01-10 DIAGNOSIS — I44.2 AV BLOCK, COMPLETE: ICD-10-CM

## 2022-01-10 DIAGNOSIS — I10 ESSENTIAL HYPERTENSION: ICD-10-CM

## 2022-01-10 PROCEDURE — 93971 EXTREMITY STUDY: CPT | Mod: PO,LT

## 2022-01-10 PROCEDURE — 93971 CV US DOPPLER VENOUS ARM LEFT (CUPID ONLY): ICD-10-PCS | Mod: 26,LT,, | Performed by: INTERNAL MEDICINE

## 2022-01-10 PROCEDURE — 93971 EXTREMITY STUDY: CPT | Mod: 26,LT,, | Performed by: INTERNAL MEDICINE

## 2022-01-12 ENCOUNTER — PATIENT MESSAGE (OUTPATIENT)
Dept: FAMILY MEDICINE | Facility: CLINIC | Age: 87
End: 2022-01-12
Payer: OTHER GOVERNMENT

## 2022-01-13 ENCOUNTER — PATIENT MESSAGE (OUTPATIENT)
Dept: CARDIOLOGY | Facility: CLINIC | Age: 87
End: 2022-01-13
Payer: OTHER GOVERNMENT

## 2022-01-13 ENCOUNTER — TELEPHONE (OUTPATIENT)
Dept: CARDIOLOGY | Facility: HOSPITAL | Age: 87
End: 2022-01-13
Payer: OTHER GOVERNMENT

## 2022-01-13 NOTE — TELEPHONE ENCOUNTER
Spoke to wife and notified it looks like bruising. Instructed to notify clinic if increases in size or any symptoms of infection to notify clinic. Spouse verbalized understanding.

## 2022-01-13 NOTE — TELEPHONE ENCOUNTER
Called and spoke to spouse, she reports swelling at the pacemaker site and brown around the ends. Denies any s&s of infection. She reports he had an u/s and swelling appears worse after u/s. Pt. currently sleeping, but instructed to send picture through portal when pt. wakes up. Provided contact info if she needs assistance

## 2022-01-13 NOTE — TELEPHONE ENCOUNTER
----- Message from Radha Calixto LPN sent at 1/12/2022  4:53 PM CST -----  Contact: pt    ----- Message -----  From: Tiffany Jeff  Sent: 1/12/2022   4:51 PM CST  To: Jasmin BENJAMIN Staff    Type: Needs Medical Advice    Who Called: pt  Best Call Back Number: [phone]    Inquiry/Question: had stitches from pacemaker taken out and the incision is swollen and brown around the ends. He wants to know if this is normal.        Thank you~

## 2022-01-19 ENCOUNTER — CLINICAL SUPPORT (OUTPATIENT)
Dept: CARDIOLOGY | Facility: HOSPITAL | Age: 87
End: 2022-01-19
Attending: INTERNAL MEDICINE
Payer: OTHER GOVERNMENT

## 2022-01-19 VITALS — HEIGHT: 66 IN | BODY MASS INDEX: 21.53 KG/M2 | WEIGHT: 134 LBS

## 2022-01-19 DIAGNOSIS — I44.2 AV BLOCK, COMPLETE: ICD-10-CM

## 2022-01-19 DIAGNOSIS — I10 ESSENTIAL HYPERTENSION: ICD-10-CM

## 2022-01-19 DIAGNOSIS — R06.02 SOB (SHORTNESS OF BREATH): ICD-10-CM

## 2022-01-19 PROCEDURE — 93306 TTE W/DOPPLER COMPLETE: CPT | Mod: 26,,, | Performed by: INTERNAL MEDICINE

## 2022-01-19 PROCEDURE — 93306 TTE W/DOPPLER COMPLETE: CPT | Mod: PO

## 2022-01-19 PROCEDURE — 93306 ECHO (CUPID ONLY): ICD-10-PCS | Mod: 26,,, | Performed by: INTERNAL MEDICINE

## 2022-01-20 ENCOUNTER — PATIENT MESSAGE (OUTPATIENT)
Dept: CARDIOLOGY | Facility: CLINIC | Age: 87
End: 2022-01-20
Payer: OTHER GOVERNMENT

## 2022-01-20 LAB
ASCENDING AORTA: 2.88 CM
AV INDEX (PROSTH): 0.42
AV MEAN GRADIENT: 21 MMHG
AV PEAK GRADIENT: 37 MMHG
AV VALVE AREA: 1.33 CM2
AV VELOCITY RATIO: 0.44
BSA FOR ECHO PROCEDURE: 1.68 M2
CV ECHO LV RWT: 0.3 CM
DOP CALC AO PEAK VEL: 3.03 M/S
DOP CALC AO VTI: 67.39 CM
DOP CALC LVOT AREA: 3.1 CM2
DOP CALC LVOT DIAMETER: 2 CM
DOP CALC LVOT PEAK VEL: 1.32 M/S
DOP CALC LVOT STROKE VOLUME: 89.36 CM3
DOP CALCLVOT PEAK VEL VTI: 28.46 CM
E WAVE DECELERATION TIME: 236.93 MSEC
E/A RATIO: 0.9
E/E' RATIO: 21.67 M/S
ECHO LV POSTERIOR WALL: 0.83 CM (ref 0.6–1.1)
EJECTION FRACTION: 55 %
FRACTIONAL SHORTENING: 37 % (ref 28–44)
INTERVENTRICULAR SEPTUM: 0.84 CM (ref 0.6–1.1)
LA MAJOR: 5.64 CM
LA MINOR: 4.77 CM
LA WIDTH: 3.71 CM
LEFT ATRIUM SIZE: 3.91 CM
LEFT ATRIUM VOLUME INDEX: 37.7 ML/M2
LEFT ATRIUM VOLUME: 63.73 CM3
LEFT INTERNAL DIMENSION IN SYSTOLE: 3.49 CM (ref 2.1–4)
LEFT VENTRICLE DIASTOLIC VOLUME INDEX: 88.06 ML/M2
LEFT VENTRICLE DIASTOLIC VOLUME: 148.82 ML
LEFT VENTRICLE MASS INDEX: 101 G/M2
LEFT VENTRICLE SYSTOLIC VOLUME INDEX: 30 ML/M2
LEFT VENTRICLE SYSTOLIC VOLUME: 50.63 ML
LEFT VENTRICULAR INTERNAL DIMENSION IN DIASTOLE: 5.52 CM (ref 3.5–6)
LEFT VENTRICULAR MASS: 169.91 G
LV LATERAL E/E' RATIO: 21.67 M/S
LV SEPTAL E/E' RATIO: 21.67 M/S
MV A" WAVE DURATION": 11.61 MSEC
MV PEAK A VEL: 1.45 M/S
MV PEAK E VEL: 1.3 M/S
PISA TR MAX VEL: 2.9 M/S
PULM VEIN S/D RATIO: 1.15
PV PEAK D VEL: 0.48 M/S
PV PEAK S VEL: 0.55 M/S
RA MAJOR: 4.08 CM
RA PRESSURE: 3 MMHG
RA WIDTH: 3.02 CM
RIGHT VENTRICULAR END-DIASTOLIC DIMENSION: 3.22 CM
RV TISSUE DOPPLER FREE WALL SYSTOLIC VELOCITY 1 (APICAL 4 CHAMBER VIEW): 14.94 CM/S
SINUS: 3.23 CM
STJ: 2.51 CM
TDI LATERAL: 0.06 M/S
TDI SEPTAL: 0.06 M/S
TDI: 0.06 M/S
TR MAX PG: 34 MMHG
TRICUSPID ANNULAR PLANE SYSTOLIC EXCURSION: 2.11 CM
TV REST PULMONARY ARTERY PRESSURE: 37 MMHG

## 2022-01-21 ENCOUNTER — PATIENT MESSAGE (OUTPATIENT)
Dept: CARDIOLOGY | Facility: CLINIC | Age: 87
End: 2022-01-21
Payer: OTHER GOVERNMENT

## 2022-01-21 NOTE — PROGRESS NOTES
Echocardiogram shows normal heart function and moderate narrowing of the aortic valve, which is unlikely to cause the shortness of breath.  if shortness of breath persists, get a nuclear stress test

## 2022-02-04 ENCOUNTER — PATIENT MESSAGE (OUTPATIENT)
Dept: CARDIOLOGY | Facility: CLINIC | Age: 87
End: 2022-02-04
Payer: OTHER GOVERNMENT

## 2022-02-20 ENCOUNTER — PATIENT MESSAGE (OUTPATIENT)
Dept: PODIATRY | Facility: CLINIC | Age: 87
End: 2022-02-20
Payer: OTHER GOVERNMENT

## 2022-02-24 ENCOUNTER — TELEPHONE (OUTPATIENT)
Dept: CARDIOLOGY | Facility: CLINIC | Age: 87
End: 2022-02-24
Payer: OTHER GOVERNMENT

## 2022-02-24 NOTE — TELEPHONE ENCOUNTER
SPOKE W/ PT WIFE / PHONE, STATES HE IS NOT HAVING ANY TACHYCARDIA, NO PALPITATIONS, NO SWELLING IN FEET OR HANDS-  HE IS SEEING HIS PCP MON., ADVISED TO KEEP THAT APPT AND IF THE PCP THINKS IT MAY BE CARDIAC RELATED TO CALL AND WE WILL GET HIM IN SOONER THAN NEXT AVAILABLE APPT, PT WIFE VU    ----- Message from Anjana Medina sent at 2/24/2022  8:24 AM CST -----  Contact: pt  Type: Needs Medical Advice    Who: Called: pt     Best Call Back Number: [phone]    Inquiry/Question: pt wife states that the pt is still having problems breathing even putting on his socks he can not move fast or bend over with out getting out of breath... please advise   Thank you~

## 2022-03-28 ENCOUNTER — TELEPHONE (OUTPATIENT)
Dept: CARDIOLOGY | Facility: HOSPITAL | Age: 87
End: 2022-03-28
Payer: OTHER GOVERNMENT

## 2022-03-28 NOTE — TELEPHONE ENCOUNTER
Returned call  Spouse states patient is having SOB and trouble with activity  Notified that the PPM is functioning WNL  Rescheduled 3mth device f/u for earlier date and scheduled appt with JIMMIE KING same day to discuss s/s patient is experiencing  VU

## 2022-03-28 NOTE — TELEPHONE ENCOUNTER
----- Message from Radha Calixto LPN sent at 3/28/2022 12:51 PM CDT -----  Contact: Spouse/Grace    ----- Message -----  From: Eric Alas  Sent: 3/28/2022  12:09 PM CDT  To: Jasmin BENJAMIN Staff    Type: Needs Medical Advice  Who Called:  Spouse/Grace  Best Call Back Number: [phone]   Additional Information: Has questions regarding pt's pacemaker. States he is having SOB during activity.

## 2022-04-01 ENCOUNTER — TELEPHONE (OUTPATIENT)
Dept: CARDIOLOGY | Facility: HOSPITAL | Age: 87
End: 2022-04-01
Payer: OTHER GOVERNMENT

## 2022-04-01 NOTE — TELEPHONE ENCOUNTER
Spoke with pt and pt's wife.  Pt stated he is still SOB but has had that since before the pacemaker was put in.  Pt stated he swung golf clubs yesterday and last night under where his pacemaker is, it hurts and feels sore.  Pt stated it might be a sore muscle.  Pt's wife is concerned about him belching, his stomach is making noises and he is not hungry.  Informed pt will look at his HM and call him back.  Pt stated he will take his BP soon.

## 2022-04-01 NOTE — TELEPHONE ENCOUNTER
Reviewed patient's s/s with Dr. Castellanos, device WNL, increase in TI noted, orders to go to ED if SOB is persistent and getting worse  Patient has appt scheduled for 4/6/22    Called and spoke with patient's spouse, notified device WNL, and Dr. Castellanos suggestions for ED, spouse VU and she clarified patient was not swinging golf club, he was swinging sand bags and he went to the gym and lifted weights

## 2022-04-01 NOTE — TELEPHONE ENCOUNTER
----- Message from Rafa Schmitz sent at 4/1/2022  7:14 AM CDT -----  Type:  Same Day Appointment Request    Caller is requesting a same day appointment.  Caller declined first available appointment listed below.      Name of Caller:  Grace Warner (Spouse  When is the first available appointment?---  Symptoms:  Pain underneath pacemaker  Best Call Back Number:  [phone]  Additional Information:

## 2022-04-03 ENCOUNTER — CLINICAL SUPPORT (OUTPATIENT)
Dept: CARDIOLOGY | Facility: HOSPITAL | Age: 87
End: 2022-04-03
Payer: MEDICARE

## 2022-04-03 DIAGNOSIS — Z95.0 PRESENCE OF CARDIAC PACEMAKER: ICD-10-CM

## 2022-04-03 PROCEDURE — 93294 CARDIAC DEVICE CHECK - REMOTE: ICD-10-PCS | Mod: ,,, | Performed by: INTERNAL MEDICINE

## 2022-04-03 PROCEDURE — 93294 REM INTERROG EVL PM/LDLS PM: CPT | Mod: ,,, | Performed by: INTERNAL MEDICINE

## 2022-04-03 PROCEDURE — 93296 REM INTERROG EVL PM/IDS: CPT | Mod: PO | Performed by: INTERNAL MEDICINE

## 2022-04-05 ENCOUNTER — PATIENT MESSAGE (OUTPATIENT)
Dept: CARDIOLOGY | Facility: CLINIC | Age: 87
End: 2022-04-05
Payer: OTHER GOVERNMENT

## 2022-04-06 ENCOUNTER — OFFICE VISIT (OUTPATIENT)
Dept: CARDIOLOGY | Facility: CLINIC | Age: 87
End: 2022-04-06
Payer: OTHER GOVERNMENT

## 2022-04-06 ENCOUNTER — CLINICAL SUPPORT (OUTPATIENT)
Dept: CARDIOLOGY | Facility: HOSPITAL | Age: 87
End: 2022-04-06
Attending: INTERNAL MEDICINE
Payer: MEDICARE

## 2022-04-06 VITALS
HEIGHT: 66 IN | HEART RATE: 83 BPM | DIASTOLIC BLOOD PRESSURE: 68 MMHG | WEIGHT: 134.69 LBS | SYSTOLIC BLOOD PRESSURE: 126 MMHG | OXYGEN SATURATION: 98 % | BODY MASS INDEX: 21.64 KG/M2

## 2022-04-06 DIAGNOSIS — R06.02 SHORTNESS OF BREATH: Primary | ICD-10-CM

## 2022-04-06 DIAGNOSIS — Z79.4 TYPE 2 DIABETES MELLITUS WITHOUT COMPLICATION, WITH LONG-TERM CURRENT USE OF INSULIN: ICD-10-CM

## 2022-04-06 DIAGNOSIS — I44.2 AV BLOCK, COMPLETE: ICD-10-CM

## 2022-04-06 DIAGNOSIS — E11.9 TYPE 2 DIABETES MELLITUS WITHOUT COMPLICATION, WITH LONG-TERM CURRENT USE OF INSULIN: ICD-10-CM

## 2022-04-06 DIAGNOSIS — I35.0 AORTIC VALVE STENOSIS, ETIOLOGY OF CARDIAC VALVE DISEASE UNSPECIFIED: ICD-10-CM

## 2022-04-06 DIAGNOSIS — D64.9 SYMPTOMATIC ANEMIA: ICD-10-CM

## 2022-04-06 DIAGNOSIS — I10 ESSENTIAL HYPERTENSION: ICD-10-CM

## 2022-04-06 DIAGNOSIS — Z95.0 CARDIAC PACEMAKER IN SITU: ICD-10-CM

## 2022-04-06 PROCEDURE — 1126F AMNT PAIN NOTED NONE PRSNT: CPT | Mod: CPTII,S$GLB,, | Performed by: PHYSICIAN ASSISTANT

## 2022-04-06 PROCEDURE — 1126F PR PAIN SEVERITY QUANTIFIED, NO PAIN PRESENT: ICD-10-PCS | Mod: CPTII,S$GLB,, | Performed by: PHYSICIAN ASSISTANT

## 2022-04-06 PROCEDURE — 1100F PR PT FALLS ASSESS DOC 2+ FALLS/FALL W/INJURY/YR: ICD-10-PCS | Mod: CPTII,S$GLB,, | Performed by: PHYSICIAN ASSISTANT

## 2022-04-06 PROCEDURE — 3288F FALL RISK ASSESSMENT DOCD: CPT | Mod: CPTII,S$GLB,, | Performed by: PHYSICIAN ASSISTANT

## 2022-04-06 PROCEDURE — 99999 PR PBB SHADOW E&M-EST. PATIENT-LVL III: ICD-10-PCS | Mod: PBBFAC,,, | Performed by: PHYSICIAN ASSISTANT

## 2022-04-06 PROCEDURE — 1100F PTFALLS ASSESS-DOCD GE2>/YR: CPT | Mod: CPTII,S$GLB,, | Performed by: PHYSICIAN ASSISTANT

## 2022-04-06 PROCEDURE — 99214 OFFICE O/P EST MOD 30 MIN: CPT | Mod: S$GLB,,, | Performed by: PHYSICIAN ASSISTANT

## 2022-04-06 PROCEDURE — 99999 PR PBB SHADOW E&M-EST. PATIENT-LVL III: CPT | Mod: PBBFAC,,, | Performed by: PHYSICIAN ASSISTANT

## 2022-04-06 PROCEDURE — 99214 PR OFFICE/OUTPT VISIT, EST, LEVL IV, 30-39 MIN: ICD-10-PCS | Mod: S$GLB,,, | Performed by: PHYSICIAN ASSISTANT

## 2022-04-06 PROCEDURE — 3288F PR FALLS RISK ASSESSMENT DOCUMENTED: ICD-10-PCS | Mod: CPTII,S$GLB,, | Performed by: PHYSICIAN ASSISTANT

## 2022-04-06 PROCEDURE — 93280 CARDIAC DEVICE CHECK - IN CLINIC & HOSPITAL: ICD-10-PCS | Mod: 26,,, | Performed by: INTERNAL MEDICINE

## 2022-04-06 PROCEDURE — 93280 PM DEVICE PROGR EVAL DUAL: CPT | Mod: 26,,, | Performed by: INTERNAL MEDICINE

## 2022-04-06 RX ORDER — INSULIN LISPRO 100 [IU]/ML
16-17 INJECTION, SOLUTION INTRAVENOUS; SUBCUTANEOUS
COMMUNITY
End: 2023-01-26 | Stop reason: CLARIF

## 2022-04-06 RX ORDER — POTASSIUM CHLORIDE 750 MG/1
10 TABLET, EXTENDED RELEASE ORAL EVERY OTHER DAY
Qty: 15 TABLET | Refills: 11 | Status: SHIPPED | OUTPATIENT
Start: 2022-04-06 | End: 2023-04-25

## 2022-04-06 RX ORDER — FUROSEMIDE 20 MG/1
20 TABLET ORAL EVERY OTHER DAY
Qty: 15 TABLET | Refills: 11 | Status: SHIPPED | OUTPATIENT
Start: 2022-04-06 | End: 2023-03-10

## 2022-04-06 NOTE — PROGRESS NOTES
Subjective:    Patient ID:  Shan Warner is a 89 y.o. male who presents for follow-up of Shortness of Breath        HPI  Mr. Warner is a very pleasant gentleman who follows with Dr. Castellanos. He presents today for routine follow up. He c/o CHOWDHURY. It was present prior to his PPM implant, but, per his wife, has gotten much worse since his procedure. He denies any PND or orthopnea. No LE edema or weight gain. He does c/o some abdominal distention.     His last echo in January showed  · The left ventricle is normal in size with normal systolic function.  · The estimated ejection fraction is 55%.  · Grade II left ventricular diastolic dysfunction.  · Normal right ventricular size with normal right ventricular systolic function.  · Mild left atrial enlargement.  · There is moderate aortic valve stenosis.  · Aortic valve area is 1.33 cm2; peak velocity is 3.03 m/s; mean gradient is 21 mmHg.  · Mild mitral regurgitation.  · Normal central venous pressure (3 mmHg).  · The estimated PA systolic pressure is 37 mmHg.       Interrogation today shows no significant arrhythmias. 100% RV paced.     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 H/H is stable.     [facility] FEV1 of 1.75 L/62% predicted and FVC of 2.25 L/58% predicted. DLCO 12.67/ 62% predicted. He has an inhaler that he states doesn't help.     Review of Systems   Constitutional: Negative for chills, diaphoresis, fever, weight gain and weight loss.   HENT: Negative for sore throat.    Eyes: Negative for blurred vision, vision loss in left eye, vision loss in right eye and visual disturbance.   Cardiovascular: Negative for chest pain, claudication, dyspnea on exertion, leg swelling, near-syncope, orthopnea, palpitations, paroxysmal nocturnal dyspnea and syncope.   Respiratory: Negative for cough, hemoptysis, shortness of breath, sputum production and wheezing.    Endocrine: Negative for cold intolerance and heat intolerance.  "  Hematologic/Lymphatic: Negative for adenopathy. Does not bruise/bleed easily.   Skin: Negative for rash.   Musculoskeletal: Negative for falls, muscle weakness and myalgias.   Gastrointestinal: Negative for abdominal pain, change in bowel habit, constipation, diarrhea, melena and nausea.   Genitourinary: Negative for bladder incontinence.   Neurological: Negative for dizziness, focal weakness, headaches, light-headedness, numbness and weakness.   Psychiatric/Behavioral: Negative for altered mental status.         Vitals:    04/06/22 1503   BP: 126/68   BP Location: Left arm   Patient Position: Sitting   BP Method: Medium (Automatic)   Pulse: 83   SpO2: 98%   Weight: 61.1 kg (134 lb 11.2 oz)   Height: 5' 6" (1.676 m)   Body mass index is 21.74 kg/m².    Objective:    Physical Exam  Constitutional:       Appearance: He is well-developed.   HENT:      Head: Normocephalic and atraumatic.   Eyes:      Extraocular Movements: Extraocular movements intact.      Pupils: Pupils are equal, round, and reactive to light.   Neck:      Thyroid: No thyromegaly.      Vascular: No JVD.      Trachea: No tracheal deviation.   Cardiovascular:      Rate and Rhythm: Normal rate and regular rhythm.      Chest Wall: PMI is not displaced.      Pulses: Normal pulses and intact distal pulses.      Heart sounds: S1 normal and S2 normal. Murmur heard.    Harsh midsystolic murmur is present with a grade of 3/6 at the upper right sternal border radiating to the neck.    No friction rub. No gallop.   Pulmonary:      Effort: Pulmonary effort is normal. No respiratory distress.      Breath sounds: Normal breath sounds. No wheezing or rales.   Chest:      Chest wall: No tenderness.   Abdominal:      General: Bowel sounds are normal. There is no distension.      Palpations: Abdomen is soft. There is no mass.      Tenderness: There is no abdominal tenderness.   Musculoskeletal:         General: No tenderness. Normal range of motion.      Cervical " back: Neck supple.   Skin:     General: Skin is warm and dry.      Findings: No rash.   Neurological:      General: No focal deficit present.      Mental Status: He is alert and oriented to person, place, and time.   Psychiatric:         Mood and Affect: Mood normal.         Behavior: Behavior normal.           Assessment:           Problem List Items Addressed This Visit        Cardiology Problems    Aortic stenosis    AV block, complete    Essential hypertension       Other    Symptomatic anemia    Type 2 diabetes mellitus without complication, with long-term current use of insulin    Relevant Medications    insulin lispro (HUMALOG KWIKPEN INSULIN) 100 unit/mL pen      Other Visit Diagnoses     Shortness of breath    -  Primary    Relevant Orders    Echo    Nuclear Stress - Cardiology Interpreted    Basic Metabolic Panel          Plan:       Nuclear stress test to evaluate for ischemia.   Limited echo to evaluate LV function given that he is 100% RV paced.   Start Lasix 20mg Every other Day + KCl 10mEg every other day.   BMP in 2 weeks.   Continue other cardiac medications.   Will contact patient with results.  F/U with Dr. Castellanos in 4 months.

## 2022-04-07 ENCOUNTER — PATIENT MESSAGE (OUTPATIENT)
Dept: CARDIOLOGY | Facility: CLINIC | Age: 87
End: 2022-04-07
Payer: OTHER GOVERNMENT

## 2022-04-27 LAB
BUN SERPL-MCNC: 25 MG/DL (ref 10–36)
BUN/CREAT SERPL: 26 (ref 10–24)
CALCIUM SERPL-MCNC: 9.6 MG/DL (ref 8.6–10.2)
CHLORIDE SERPL-SCNC: 107 MMOL/L (ref 96–106)
CO2 SERPL-SCNC: 19 MMOL/L (ref 20–29)
CREAT SERPL-MCNC: 0.98 MG/DL (ref 0.76–1.27)
EST. GFR  (NO RACE VARIABLE): 73 ML/MIN/1.73
GLUCOSE SERPL-MCNC: 188 MG/DL (ref 65–99)
POTASSIUM SERPL-SCNC: 4.2 MMOL/L (ref 3.5–5.2)
SODIUM SERPL-SCNC: 142 MMOL/L (ref 134–144)

## 2022-05-04 ENCOUNTER — CLINICAL SUPPORT (OUTPATIENT)
Dept: CARDIOLOGY | Facility: HOSPITAL | Age: 87
End: 2022-05-04
Attending: PHYSICIAN ASSISTANT
Payer: MEDICARE

## 2022-05-04 ENCOUNTER — HOSPITAL ENCOUNTER (OUTPATIENT)
Dept: RADIOLOGY | Facility: HOSPITAL | Age: 87
Discharge: HOME | End: 2022-05-04
Attending: PHYSICIAN ASSISTANT
Payer: MEDICARE

## 2022-05-04 VITALS
DIASTOLIC BLOOD PRESSURE: 55 MMHG | HEIGHT: 66 IN | SYSTOLIC BLOOD PRESSURE: 146 MMHG | WEIGHT: 134 LBS | BODY MASS INDEX: 21.53 KG/M2

## 2022-05-04 VITALS — HEIGHT: 66 IN | WEIGHT: 134 LBS | BODY MASS INDEX: 21.53 KG/M2

## 2022-05-04 DIAGNOSIS — R06.02 SHORTNESS OF BREATH: ICD-10-CM

## 2022-05-04 LAB
CV PHARM DOSE: 0.4 MG
CV STRESS BASE HR: 63 BPM
DIASTOLIC BLOOD PRESSURE: 55 MMHG
NUC REST EJECTION FRACTION: 63
OHS CV CPX 1 MINUTE RECOVERY HEART RATE: 92 BPM
OHS CV CPX 85 PERCENT MAX PREDICTED HEART RATE MALE: 111
OHS CV CPX MAX PREDICTED HEART RATE: 130
OHS CV CPX PATIENT IS FEMALE: 0
OHS CV CPX PATIENT IS MALE: 1
OHS CV CPX PEAK DIASTOLIC BLOOD PRESSURE: 55 MMHG
OHS CV CPX PEAK HEAR RATE: 100 BPM
OHS CV CPX PEAK RATE PRESSURE PRODUCT: NORMAL
OHS CV CPX PEAK SYSTOLIC BLOOD PRESSURE: 146 MMHG
OHS CV CPX PERCENT MAX PREDICTED HEART RATE ACHIEVED: 77
OHS CV CPX RATE PRESSURE PRODUCT PRESENTING: 9198
OHS CV PHARM TIME: 1433 MIN
SYSTOLIC BLOOD PRESSURE: 146 MMHG

## 2022-05-04 PROCEDURE — 93017 CV STRESS TEST TRACING ONLY: CPT | Mod: PO

## 2022-05-04 PROCEDURE — 93018 PR CARDIAC STRESS TST,INTERP/REPT ONLY: ICD-10-PCS | Mod: ,,, | Performed by: INTERNAL MEDICINE

## 2022-05-04 PROCEDURE — 63600175 PHARM REV CODE 636 W HCPCS: Mod: PO | Performed by: PHYSICIAN ASSISTANT

## 2022-05-04 PROCEDURE — 93018 CV STRESS TEST I&R ONLY: CPT | Mod: ,,, | Performed by: INTERNAL MEDICINE

## 2022-05-04 PROCEDURE — 78452 HT MUSCLE IMAGE SPECT MULT: CPT | Mod: 26,,, | Performed by: INTERNAL MEDICINE

## 2022-05-04 PROCEDURE — 93016 CV STRESS TEST SUPVJ ONLY: CPT | Mod: ,,, | Performed by: INTERNAL MEDICINE

## 2022-05-04 PROCEDURE — 93306 TTE W/DOPPLER COMPLETE: CPT | Mod: 26,,, | Performed by: INTERNAL MEDICINE

## 2022-05-04 PROCEDURE — A9502 TC99M TETROFOSMIN: HCPCS | Mod: PO

## 2022-05-04 PROCEDURE — 93306 TTE W/DOPPLER COMPLETE: CPT | Mod: PO

## 2022-05-04 PROCEDURE — 78452 STRESS TEST WITH MYOCARDIAL PERFUSION (CUPID ONLY): ICD-10-PCS | Mod: 26,,, | Performed by: INTERNAL MEDICINE

## 2022-05-04 PROCEDURE — 93306 ECHO (CUPID ONLY): ICD-10-PCS | Mod: 26,,, | Performed by: INTERNAL MEDICINE

## 2022-05-04 PROCEDURE — 93016 STRESS TEST WITH MYOCARDIAL PERFUSION (CUPID ONLY): ICD-10-PCS | Mod: ,,, | Performed by: INTERNAL MEDICINE

## 2022-05-04 RX ORDER — REGADENOSON 0.08 MG/ML
0.4 INJECTION, SOLUTION INTRAVENOUS ONCE
Status: COMPLETED | OUTPATIENT
Start: 2022-05-04 | End: 2022-05-04

## 2022-05-04 RX ADMIN — REGADENOSON 0.4 MG: 0.08 INJECTION, SOLUTION INTRAVENOUS at 02:05

## 2022-05-05 ENCOUNTER — PATIENT MESSAGE (OUTPATIENT)
Dept: CARDIOLOGY | Facility: CLINIC | Age: 87
End: 2022-05-05
Payer: OTHER GOVERNMENT

## 2022-05-05 LAB
ASCENDING AORTA: 3.3 CM
AV INDEX (PROSTH): 0.4
AV MEAN GRADIENT: 21 MMHG
AV PEAK GRADIENT: 37 MMHG
AV VALVE AREA: 1.31 CM2
AV VELOCITY RATIO: 0.41
BSA FOR ECHO PROCEDURE: 1.68 M2
CV ECHO LV RWT: 0.31 CM
DOP CALC AO PEAK VEL: 3.03 M/S
DOP CALC AO VTI: 64.56 CM
DOP CALC LVOT AREA: 3.2 CM2
DOP CALC LVOT DIAMETER: 2.03 CM
DOP CALC LVOT PEAK VEL: 1.25 M/S
DOP CALC LVOT STROKE VOLUME: 84.43 CM3
DOP CALC MV VTI: 48.18 CM
DOP CALCLVOT PEAK VEL VTI: 26.1 CM
E WAVE DECELERATION TIME: 191.55 MSEC
E/A RATIO: 0.86
E/E' RATIO: 26 M/S
ECHO LV POSTERIOR WALL: 0.8 CM (ref 0.6–1.1)
EJECTION FRACTION: 60 %
FRACTIONAL SHORTENING: 29 % (ref 28–44)
INTERVENTRICULAR SEPTUM: 0.78 CM (ref 0.6–1.1)
LA MAJOR: 5.99 CM
LA MINOR: 5.95 CM
LA WIDTH: 3.93 CM
LEFT ATRIUM SIZE: 4.39 CM
LEFT ATRIUM VOLUME INDEX: 51.8 ML/M2
LEFT ATRIUM VOLUME: 87.55 CM3
LEFT INTERNAL DIMENSION IN SYSTOLE: 3.71 CM (ref 2.1–4)
LEFT VENTRICLE DIASTOLIC VOLUME INDEX: 77.41 ML/M2
LEFT VENTRICLE DIASTOLIC VOLUME: 130.82 ML
LEFT VENTRICLE MASS INDEX: 85 G/M2
LEFT VENTRICLE SYSTOLIC VOLUME INDEX: 34.7 ML/M2
LEFT VENTRICLE SYSTOLIC VOLUME: 58.62 ML
LEFT VENTRICULAR INTERNAL DIMENSION IN DIASTOLE: 5.22 CM (ref 3.5–6)
LEFT VENTRICULAR MASS: 143.87 G
LV LATERAL E/E' RATIO: 21.67 M/S
LV SEPTAL E/E' RATIO: 32.5 M/S
MV A" WAVE DURATION": 9.51 MSEC
MV MEAN GRADIENT: 1 MMHG
MV PEAK A VEL: 1.52 M/S
MV PEAK E VEL: 1.3 M/S
MV PEAK GRADIENT: 13 MMHG
MV STENOSIS PRESSURE HALF TIME: 55.55 MS
MV VALVE AREA BY CONTINUITY EQUATION: 1.75 CM2
MV VALVE AREA P 1/2 METHOD: 3.96 CM2
PISA MRMAX VEL: 0.06 M/S
PISA TR MAX VEL: 2.63 M/S
PULM VEIN S/D RATIO: 1.37
PV PEAK D VEL: 0.46 M/S
PV PEAK S VEL: 0.63 M/S
RA MAJOR: 4.53 CM
RA PRESSURE: 3 MMHG
RA WIDTH: 3.6 CM
RIGHT VENTRICULAR END-DIASTOLIC DIMENSION: 3.26 CM
RV TISSUE DOPPLER FREE WALL SYSTOLIC VELOCITY 1 (APICAL 4 CHAMBER VIEW): 12.59 CM/S
SINUS: 3.03 CM
STJ: 2.77 CM
TDI LATERAL: 0.06 M/S
TDI SEPTAL: 0.04 M/S
TDI: 0.05 M/S
TR MAX PG: 28 MMHG
TRICUSPID ANNULAR PLANE SYSTOLIC EXCURSION: 2.25 CM
TV REST PULMONARY ARTERY PRESSURE: 31 MMHG

## 2022-05-06 ENCOUNTER — TELEPHONE (OUTPATIENT)
Dept: CARDIOLOGY | Facility: CLINIC | Age: 87
End: 2022-05-06
Payer: OTHER GOVERNMENT

## 2022-05-06 NOTE — TELEPHONE ENCOUNTER
Spoke to wife at 11 am when call center IM'd me. Advised to take pt to ER or urgent care; she expressed understanding

## 2022-05-06 NOTE — TELEPHONE ENCOUNTER
----- Message from Rupa Murrieta sent at 5/6/2022 11:00 AM CDT -----  Type:  Patient Returning Call    Who Called:  wife   Who Left Message for Patient:  Joyce Valdovinosnton  Does the patient know what this is regarding?:  yes   Best Call Back Number:  [phone]   Additional Information:  please advise-thank you

## 2022-05-08 ENCOUNTER — PATIENT MESSAGE (OUTPATIENT)
Dept: CARDIOLOGY | Facility: CLINIC | Age: 87
End: 2022-05-08
Payer: OTHER GOVERNMENT

## 2022-05-09 ENCOUNTER — PATIENT MESSAGE (OUTPATIENT)
Dept: CARDIOLOGY | Facility: CLINIC | Age: 87
End: 2022-05-09
Payer: OTHER GOVERNMENT

## 2022-05-09 ENCOUNTER — DOCUMENTATION ONLY (OUTPATIENT)
Dept: CARDIOLOGY | Facility: CLINIC | Age: 87
End: 2022-05-09
Payer: OTHER GOVERNMENT

## 2022-05-09 RX ORDER — METOPROLOL SUCCINATE 25 MG/1
12.5 TABLET, EXTENDED RELEASE ORAL NIGHTLY
Qty: 15 TABLET | Refills: 11 | Status: ON HOLD | OUTPATIENT
Start: 2022-05-09 | End: 2023-01-16 | Stop reason: SDUPTHER

## 2022-05-09 NOTE — PROGRESS NOTES
Called to discuss stress test and echo results with Mrs. Warner. Given small sized, mild intensity defect on stress test, will pursue medical management first in this 89 yo gentleman.     Start Toprol XL 12.5mg QHS.   She will contact me in 2 weeks if no improvement in symptoms.   Will send results to Dr. Allen at her request as well.   F/U with Dr. Castellanos in 1-2 months.

## 2022-06-09 PROBLEM — I25.10 CORONARY ARTERY DISEASE INVOLVING NATIVE CORONARY ARTERY: Status: ACTIVE | Noted: 2022-06-09

## 2022-06-09 PROBLEM — Z71.89 ACP (ADVANCE CARE PLANNING): Status: ACTIVE | Noted: 2022-06-09

## 2022-06-09 PROBLEM — K92.2 GI BLEED: Status: ACTIVE | Noted: 2022-06-09

## 2022-06-09 PROBLEM — D62 ACUTE BLOOD LOSS ANEMIA: Status: ACTIVE | Noted: 2022-06-09

## 2022-06-09 PROBLEM — N40.0 BPH (BENIGN PROSTATIC HYPERPLASIA): Status: ACTIVE | Noted: 2022-06-09

## 2022-06-13 ENCOUNTER — OFFICE VISIT (OUTPATIENT)
Dept: CARDIOLOGY | Facility: CLINIC | Age: 87
End: 2022-06-13
Payer: OTHER GOVERNMENT

## 2022-06-13 VITALS
DIASTOLIC BLOOD PRESSURE: 59 MMHG | SYSTOLIC BLOOD PRESSURE: 113 MMHG | WEIGHT: 132.5 LBS | HEART RATE: 67 BPM | BODY MASS INDEX: 20.08 KG/M2 | HEIGHT: 68 IN

## 2022-06-13 DIAGNOSIS — K25.4 GASTROINTESTINAL HEMORRHAGE ASSOCIATED WITH GASTRIC ULCER: ICD-10-CM

## 2022-06-13 DIAGNOSIS — I44.2 AV BLOCK, COMPLETE: ICD-10-CM

## 2022-06-13 DIAGNOSIS — I10 ESSENTIAL HYPERTENSION: Primary | ICD-10-CM

## 2022-06-13 PROCEDURE — 99999 PR PBB SHADOW E&M-EST. PATIENT-LVL IV: CPT | Mod: PBBFAC,,, | Performed by: INTERNAL MEDICINE

## 2022-06-13 PROCEDURE — 99214 OFFICE O/P EST MOD 30 MIN: CPT | Mod: PBBFAC,PO | Performed by: INTERNAL MEDICINE

## 2022-06-13 PROCEDURE — 99214 OFFICE O/P EST MOD 30 MIN: CPT | Mod: S$PBB,,, | Performed by: INTERNAL MEDICINE

## 2022-06-13 PROCEDURE — 99999 PR PBB SHADOW E&M-EST. PATIENT-LVL IV: ICD-10-PCS | Mod: PBBFAC,,, | Performed by: INTERNAL MEDICINE

## 2022-06-13 PROCEDURE — 99214 PR OFFICE/OUTPT VISIT, EST, LEVL IV, 30-39 MIN: ICD-10-PCS | Mod: S$PBB,,, | Performed by: INTERNAL MEDICINE

## 2022-06-13 NOTE — PROGRESS NOTES
Subjective:    Patient ID:  Shan Warner is a 89 y.o. male who presents for follow-up of Abnormal stress test    HPI  Admitted to [facility] last week with what appears to be a episode of GI bleed.  Which required upper GI endoscopy with cauterization of a couple of spots.  He required 2 units of red blood cells as well.  He does also refer frequent falls, most likely secondary to tripping rather than dizziness lightheadedness or loss of consciousness  He comes for follow up with no major problems, no chest pain, no shortness of breath.      Review of Systems   Constitutional: Negative for decreased appetite, malaise/fatigue, weight gain and weight loss.   Cardiovascular: Negative for chest pain, dyspnea on exertion, leg swelling, palpitations and syncope.   Respiratory: Negative for cough and shortness of breath.    Gastrointestinal: Negative.    Neurological: Negative for weakness.   All other systems reviewed and are negative.       Objective:      Physical Exam  Vitals and nursing note reviewed.   Constitutional:       Appearance: Normal appearance. He is well-developed.   HENT:      Head: Normocephalic.   Eyes:      Pupils: Pupils are equal, round, and reactive to light.   Neck:      Thyroid: No thyromegaly.      Vascular: No carotid bruit or JVD.   Cardiovascular:      Rate and Rhythm: Normal rate and regular rhythm.      Chest Wall: PMI is not displaced.      Pulses: Normal pulses and intact distal pulses.      Heart sounds: Normal heart sounds. No murmur heard.    No gallop.   Pulmonary:      Effort: Pulmonary effort is normal.      Breath sounds: Normal breath sounds.   Abdominal:      Palpations: Abdomen is soft. There is no mass.      Tenderness: There is no abdominal tenderness.   Musculoskeletal:         General: Normal range of motion.      Cervical back: Normal range of motion and neck supple.   Skin:     General: Skin is warm.   Neurological:      Mental Status: He is alert and oriented to  person, place, and time.      Sensory: No sensory deficit.      Deep Tendon Reflexes: Reflexes are normal and symmetric.     [facility] a very small area of ischemia in the inferolateral wall, which may be secondary to pacemaker      Assessment:       1. Essential hypertension    2. AV block, complete    3. Gastrointestinal hemorrhage associated with gastric ulcer         Plan:   At this time, no need for cardiac catheterization, given that the patient is asymptomatic and stress test shows just very mild area of possible ischemia  Continue all cardiac medications  Regular exercise program  Six-month follow-up with Joyce

## 2022-07-02 ENCOUNTER — CLINICAL SUPPORT (OUTPATIENT)
Dept: CARDIOLOGY | Facility: HOSPITAL | Age: 87
End: 2022-07-02
Payer: OTHER GOVERNMENT

## 2022-07-02 DIAGNOSIS — Z95.0 PRESENCE OF CARDIAC PACEMAKER: ICD-10-CM

## 2022-07-02 PROCEDURE — 93296 REM INTERROG EVL PM/IDS: CPT | Mod: PO | Performed by: INTERNAL MEDICINE

## 2022-08-31 ENCOUNTER — TELEPHONE (OUTPATIENT)
Dept: CARDIOLOGY | Facility: CLINIC | Age: 87
End: 2022-08-31
Payer: OTHER GOVERNMENT

## 2022-08-31 NOTE — TELEPHONE ENCOUNTER
----- Message from Taisha Calhoun sent at 8/31/2022  4:00 PM CDT -----  Contact: [phone]  Type: Needs Medical Advice  Who Called: Pt     Best Call Back Number: [phone]    Additional Information: Pts wife is calling to ask if its ok for pt to move a portable generator and a microwave with pacemaker. Pls call back and advise

## 2022-09-30 ENCOUNTER — CLINICAL SUPPORT (OUTPATIENT)
Dept: CARDIOLOGY | Facility: HOSPITAL | Age: 87
End: 2022-09-30
Payer: OTHER GOVERNMENT

## 2022-09-30 DIAGNOSIS — Z95.0 PRESENCE OF CARDIAC PACEMAKER: ICD-10-CM

## 2022-09-30 PROCEDURE — 93294 CARDIAC DEVICE CHECK - REMOTE: ICD-10-PCS | Mod: ,,, | Performed by: INTERNAL MEDICINE

## 2022-09-30 PROCEDURE — 93294 REM INTERROG EVL PM/LDLS PM: CPT | Mod: ,,, | Performed by: INTERNAL MEDICINE

## 2022-09-30 PROCEDURE — 93296 REM INTERROG EVL PM/IDS: CPT | Mod: PO | Performed by: INTERNAL MEDICINE

## 2022-12-05 ENCOUNTER — OFFICE VISIT (OUTPATIENT)
Dept: CARDIOLOGY | Facility: CLINIC | Age: 87
End: 2022-12-05
Payer: OTHER GOVERNMENT

## 2022-12-05 VITALS
SYSTOLIC BLOOD PRESSURE: 132 MMHG | DIASTOLIC BLOOD PRESSURE: 51 MMHG | BODY MASS INDEX: 21.04 KG/M2 | WEIGHT: 130.88 LBS | HEIGHT: 66 IN

## 2022-12-05 DIAGNOSIS — I10 ESSENTIAL HYPERTENSION: ICD-10-CM

## 2022-12-05 DIAGNOSIS — I44.2 AV BLOCK, COMPLETE: ICD-10-CM

## 2022-12-05 DIAGNOSIS — R06.02 SOB (SHORTNESS OF BREATH): Primary | ICD-10-CM

## 2022-12-05 DIAGNOSIS — R00.1 BRADYCARDIA: ICD-10-CM

## 2022-12-05 PROCEDURE — 99213 OFFICE O/P EST LOW 20 MIN: CPT | Mod: PBBFAC,PO | Performed by: INTERNAL MEDICINE

## 2022-12-05 PROCEDURE — 99214 PR OFFICE/OUTPT VISIT, EST, LEVL IV, 30-39 MIN: ICD-10-PCS | Mod: S$PBB,,, | Performed by: INTERNAL MEDICINE

## 2022-12-05 PROCEDURE — 99214 OFFICE O/P EST MOD 30 MIN: CPT | Mod: S$PBB,,, | Performed by: INTERNAL MEDICINE

## 2022-12-05 PROCEDURE — 99999 PR PBB SHADOW E&M-EST. PATIENT-LVL III: CPT | Mod: PBBFAC,,, | Performed by: INTERNAL MEDICINE

## 2022-12-05 PROCEDURE — 99999 PR PBB SHADOW E&M-EST. PATIENT-LVL III: ICD-10-PCS | Mod: PBBFAC,,, | Performed by: INTERNAL MEDICINE

## 2022-12-05 NOTE — PROGRESS NOTES
Subjective:    Patient ID:  Shan Warner is a 89 y.o. male who presents for follow-up of shortness of breath    HPI  He comes for follow up with shortness of breath with minimal activity.  No chest pain.  This patient states that the symptoms started pretty much after he got the permanent pacemaker last year.  No syncope or near-syncope.      Review of Systems   Constitutional: Negative for decreased appetite, malaise/fatigue, weight gain and weight loss.   Cardiovascular:  Negative for chest pain, dyspnea on exertion, leg swelling, palpitations and syncope.   Respiratory:  Negative for cough and shortness of breath.    Gastrointestinal: Negative.    Neurological:  Negative for weakness.   All other systems reviewed and are negative.     Objective:      Physical Exam  Vitals and nursing note reviewed.   Constitutional:       Appearance: Normal appearance. He is well-developed.   HENT:      Head: Normocephalic.   Eyes:      Pupils: Pupils are equal, round, and reactive to light.   Neck:      Thyroid: No thyromegaly.      Vascular: No carotid bruit or JVD.   Cardiovascular:      Rate and Rhythm: Normal rate and regular rhythm.      Chest Wall: PMI is not displaced.      Pulses: Normal pulses and intact distal pulses.      Heart sounds: Murmur heard.   Harsh midsystolic murmur is present with a grade of 3/6 at the upper right sternal border radiating to the neck.     No gallop.   Pulmonary:      Effort: Pulmonary effort is normal.      Breath sounds: Normal breath sounds.   Abdominal:      Palpations: Abdomen is soft. There is no mass.      Tenderness: There is no abdominal tenderness.   Musculoskeletal:         General: Normal range of motion.      Cervical back: Normal range of motion and neck supple.   Skin:     General: Skin is warm.   Neurological:      Mental Status: He is alert and oriented to person, place, and time.      Sensory: No sensory deficit.      Deep Tendon Reflexes: Reflexes are normal and  symmetric.     Pacemaker interrogation reveals that he is having ventricular pacing greater than 99% of the time        Assessment:       1. SOB (shortness of breath)    2. Essential hypertension    3. Bradycardia    4. AV block, complete         Plan:   Echocardiogram, Call with results

## 2022-12-09 ENCOUNTER — CLINICAL SUPPORT (OUTPATIENT)
Dept: CARDIOLOGY | Facility: HOSPITAL | Age: 87
End: 2022-12-09
Attending: INTERNAL MEDICINE
Payer: MEDICARE

## 2022-12-09 VITALS — BODY MASS INDEX: 20.89 KG/M2 | WEIGHT: 130 LBS | HEIGHT: 66 IN

## 2022-12-09 DIAGNOSIS — R06.02 SOB (SHORTNESS OF BREATH): ICD-10-CM

## 2022-12-09 PROCEDURE — 93306 TTE W/DOPPLER COMPLETE: CPT | Mod: PO

## 2022-12-09 PROCEDURE — 93306 ECHO (CUPID ONLY): ICD-10-PCS | Mod: 26,,, | Performed by: INTERNAL MEDICINE

## 2022-12-09 PROCEDURE — 93306 TTE W/DOPPLER COMPLETE: CPT | Mod: 26,,, | Performed by: INTERNAL MEDICINE

## 2022-12-12 LAB
ASCENDING AORTA: 3.02 CM
AV INDEX (PROSTH): 0.27
AV MEAN GRADIENT: 29 MMHG
AV PEAK GRADIENT: 52 MMHG
AV VALVE AREA: 0.84 CM2
AV VELOCITY RATIO: 0.3
BSA FOR ECHO PROCEDURE: 1.66 M2
CV ECHO LV RWT: 0.43 CM
DOP CALC AO PEAK VEL: 3.6 M/S
DOP CALC AO VTI: 87.5 CM
DOP CALC LVOT AREA: 3.1 CM2
DOP CALC LVOT DIAMETER: 2 CM
DOP CALC LVOT PEAK VEL: 1.09 M/S
DOP CALC LVOT STROKE VOLUME: 73.16 CM3
DOP CALCLVOT PEAK VEL VTI: 23.3 CM
E WAVE DECELERATION TIME: 297.66 MSEC
E/A RATIO: 0.77
E/E' RATIO: 28.25 M/S
ECHO LV POSTERIOR WALL: 0.99 CM (ref 0.6–1.1)
EJECTION FRACTION: 55 %
FRACTIONAL SHORTENING: 29 % (ref 28–44)
INTERVENTRICULAR SEPTUM: 1.01 CM (ref 0.6–1.1)
IVRT: 102.76 MSEC
LA MAJOR: 5.67 CM
LA MINOR: 5.69 CM
LA WIDTH: 3.7 CM
LEFT ATRIUM SIZE: 4.13 CM
LEFT ATRIUM VOLUME INDEX: 44.2 ML/M2
LEFT ATRIUM VOLUME: 73.78 CM3
LEFT INTERNAL DIMENSION IN SYSTOLE: 3.3 CM (ref 2.1–4)
LEFT VENTRICLE DIASTOLIC VOLUME INDEX: 59.35 ML/M2
LEFT VENTRICLE DIASTOLIC VOLUME: 99.12 ML
LEFT VENTRICLE MASS INDEX: 96 G/M2
LEFT VENTRICLE SYSTOLIC VOLUME INDEX: 26.4 ML/M2
LEFT VENTRICLE SYSTOLIC VOLUME: 44.11 ML
LEFT VENTRICULAR INTERNAL DIMENSION IN DIASTOLE: 4.64 CM (ref 3.5–6)
LEFT VENTRICULAR MASS: 161.06 G
LV LATERAL E/E' RATIO: 28.25 M/S
LV SEPTAL E/E' RATIO: 28.25 M/S
LVOT MG: 2.35 MMHG
LVOT MV: 0.71 CM/S
MV PEAK A VEL: 1.46 M/S
MV PEAK E VEL: 1.13 M/S
MV STENOSIS PRESSURE HALF TIME: 86.32 MS
MV VALVE AREA P 1/2 METHOD: 2.55 CM2
PISA TR MAX VEL: 2.79 M/S
PULM VEIN S/D RATIO: 0.81
PV PEAK D VEL: 0.53 M/S
PV PEAK S VEL: 0.43 M/S
RA MAJOR: 4.2 CM
RA PRESSURE: 3 MMHG
RA WIDTH: 3 CM
RIGHT VENTRICULAR END-DIASTOLIC DIMENSION: 3.63 CM
RIGHT VENTRICULAR LENGTH IN DIASTOLE (APICAL 4-CHAMBER VIEW): 6.03 CM
RV MID DIAMA: 2.42 CM
RV TISSUE DOPPLER FREE WALL SYSTOLIC VELOCITY 1 (APICAL 4 CHAMBER VIEW): 0.01 CM/S
SINUS: 2.98 CM
STJ: 2.86 CM
TDI LATERAL: 0.04 M/S
TDI SEPTAL: 0.04 M/S
TDI: 0.04 M/S
TR MAX PG: 31 MMHG
TRICUSPID ANNULAR PLANE SYSTOLIC EXCURSION: 2.16 CM
TV REST PULMONARY ARTERY PRESSURE: 34 MMHG

## 2022-12-12 NOTE — PROGRESS NOTES
Echocardiogram shows normal heart function.  Nevertheless, the aortic valve has become severely narrowed.  Please make an appointment to discuss further treatment options

## 2022-12-14 ENCOUNTER — OFFICE VISIT (OUTPATIENT)
Dept: CARDIOLOGY | Facility: CLINIC | Age: 87
End: 2022-12-14
Payer: OTHER GOVERNMENT

## 2022-12-14 VITALS
SYSTOLIC BLOOD PRESSURE: 123 MMHG | HEART RATE: 59 BPM | BODY MASS INDEX: 21.76 KG/M2 | HEIGHT: 66 IN | WEIGHT: 135.38 LBS | DIASTOLIC BLOOD PRESSURE: 52 MMHG

## 2022-12-14 DIAGNOSIS — I10 ESSENTIAL HYPERTENSION: ICD-10-CM

## 2022-12-14 DIAGNOSIS — R06.02 SOB (SHORTNESS OF BREATH): ICD-10-CM

## 2022-12-14 DIAGNOSIS — I35.0 AORTIC VALVE STENOSIS, ETIOLOGY OF CARDIAC VALVE DISEASE UNSPECIFIED: Primary | ICD-10-CM

## 2022-12-14 DIAGNOSIS — I44.2 AV BLOCK, COMPLETE: ICD-10-CM

## 2022-12-14 PROCEDURE — 99214 OFFICE O/P EST MOD 30 MIN: CPT | Mod: S$PBB,,, | Performed by: INTERNAL MEDICINE

## 2022-12-14 PROCEDURE — 99214 PR OFFICE/OUTPT VISIT, EST, LEVL IV, 30-39 MIN: ICD-10-PCS | Mod: S$PBB,,, | Performed by: INTERNAL MEDICINE

## 2022-12-14 PROCEDURE — 99213 OFFICE O/P EST LOW 20 MIN: CPT | Mod: PBBFAC,PO | Performed by: INTERNAL MEDICINE

## 2022-12-14 PROCEDURE — 99999 PR PBB SHADOW E&M-EST. PATIENT-LVL III: ICD-10-PCS | Mod: PBBFAC,,, | Performed by: INTERNAL MEDICINE

## 2022-12-14 PROCEDURE — 99999 PR PBB SHADOW E&M-EST. PATIENT-LVL III: CPT | Mod: PBBFAC,,, | Performed by: INTERNAL MEDICINE

## 2022-12-14 RX ORDER — CLOPIDOGREL BISULFATE 75 MG/1
300 TABLET ORAL ONCE
Status: CANCELLED | OUTPATIENT
Start: 2022-12-14 | End: 2022-12-14

## 2022-12-14 RX ORDER — SODIUM CHLORIDE 9 MG/ML
INJECTION, SOLUTION INTRAVENOUS ONCE
Status: CANCELLED | OUTPATIENT
Start: 2022-12-14 | End: 2022-12-14

## 2022-12-14 NOTE — PATIENT INSTRUCTIONS
Angiogram 1/6 at 9 am arrive at 7 am    Arrive for procedure at: [facility]    You will receive a phone call from [facility] Pre-Op Department with further instructions prior to your scheduled procedure.    Notify the nurse if you are ALLERGIC TO IODINE.    FASTING: You MAY NOT have anything to eat or drink AFTER MIDNIGHT the day before your procedure. If your procedure is scheduled in the afternoon, you may have a LIGHT BREAKFAST 6-8 hours prior to your procedure.  For example: Two slices of toast; black coffee or black tea.    MEDICATIONS: You may take your regular morning medications with water. If there are any medications that you should not take, you will be instructed to hold them for that morning.    CARDIOLOGY PRE-PROCEDURE MEDICATION ORDERS:  ** Please hold any medications that are checked below:    HOLD   # OF DAYS TO HOLD  Coumadin   Consult with Coumadin Clinic   Xarelto    _DAY BEFORE & DAY OF_  Pradaxa  _ DAY BEFORE & DAY OF _  Eliquis   _ DAY BEFORE & DAY OF _  Metformin    Day before procedure & morning of procedure  Short acting insulin   Morning of procedure    CONTINUE the Following Medications   Plavix      Effient     Aspirin    WHAT TO EXPECT:    How long will the procedure take?  The procedure will take an average of 1 - 2 hours to perform.  After the procedure, you will need to lay flat for around 4 - 6 hours to minimize bleeding from the puncture site. If the wrist is accessed you will need to keep your arm still as instructed by the nurse.    When can I go home?  You may be able to be discharged home that same afternoon if there were no complications.  If you have one of the following: balloon; stent; pacemaker or defibrillator procedures, you may spend one night for observation.  Your doctor will determine your discharge based upon your progress.  The results of your procedure will be discussed with you before you are discharged.  Any further testing or procedures will be  scheduled for you either before you leave or you will be instructed to call for a future appointment.      TRANSPORTATION:  PLEASE ARRANGE TO HAVE SOMEONE DRIVE YOU HOME FOLLOWING YOUR PROCEDURE, YOU WILL NOT BE ALLOWED TO DRIVE.

## 2022-12-14 NOTE — PROGRESS NOTES
Subjective:    Patient ID:  Shan Warner is a 89 y.o. male who presents for follow-up of Shortness of Breath (F/u from echo done on 12/5/22) and Hypertension      HPI  He comes for follow up with persistent shortness of breath with moderate activity.  No chest pain.  No syncope.  No leg swelling      Review of Systems   Constitutional: Negative for decreased appetite, malaise/fatigue, weight gain and weight loss.   Cardiovascular:  Negative for chest pain, dyspnea on exertion, leg swelling, palpitations and syncope.   Respiratory:  Negative for cough and shortness of breath.    Gastrointestinal: Negative.    Neurological:  Negative for weakness.   All other systems reviewed and are negative.     Objective:      Physical Exam  Vitals and nursing note reviewed.   Constitutional:       Appearance: Normal appearance. He is well-developed.   HENT:      Head: Normocephalic.   Eyes:      Pupils: Pupils are equal, round, and reactive to light.   Neck:      Thyroid: No thyromegaly.      Vascular: No carotid bruit or JVD.   Cardiovascular:      Rate and Rhythm: Normal rate and regular rhythm.      Chest Wall: PMI is not displaced.      Pulses: Normal pulses and intact distal pulses.      Heart sounds: Murmur heard.   Harsh midsystolic murmur is present at the upper right sternal border radiating to the neck.     No gallop.   Pulmonary:      Effort: Pulmonary effort is normal.      Breath sounds: Normal breath sounds.   Abdominal:      Palpations: Abdomen is soft. There is no mass.      Tenderness: There is no abdominal tenderness.   Musculoskeletal:         General: Normal range of motion.      Cervical back: Normal range of motion and neck supple.   Skin:     General: Skin is warm.   Neurological:      Mental Status: He is alert and oriented to person, place, and time.      Sensory: No sensory deficit.      Deep Tendon Reflexes: Reflexes are normal and symmetric.       Echocardiogram consistent with severe aortic  stenosis.    Assessment:       1. Aortic valve stenosis, etiology of cardiac valve disease unspecified    2. SOB (shortness of breath)    3. AV block, complete    4. Essential hypertension         Plan:     Coronary angiography +/- PCI  TAVR referral after that

## 2022-12-29 ENCOUNTER — CLINICAL SUPPORT (OUTPATIENT)
Dept: CARDIOLOGY | Facility: HOSPITAL | Age: 87
End: 2022-12-29
Payer: OTHER GOVERNMENT

## 2022-12-29 DIAGNOSIS — Z95.0 PRESENCE OF CARDIAC PACEMAKER: ICD-10-CM

## 2022-12-29 PROCEDURE — 93296 REM INTERROG EVL PM/IDS: CPT | Mod: PO | Performed by: INTERNAL MEDICINE

## 2023-01-03 ENCOUNTER — TELEPHONE (OUTPATIENT)
Dept: CARDIOLOGY | Facility: CLINIC | Age: 88
End: 2023-01-03
Payer: OTHER GOVERNMENT

## 2023-01-03 ENCOUNTER — TELEPHONE (OUTPATIENT)
Dept: GASTROENTEROLOGY | Facility: CLINIC | Age: 88
End: 2023-01-03
Payer: OTHER GOVERNMENT

## 2023-01-03 NOTE — TELEPHONE ENCOUNTER
----- Message from Caroline Anderson sent at 1/3/2023  4:50 PM CST -----  Type: Needs Medical Advice  Who Called:  pt wife     Best Call Back Number: [phone]   Additional Information: pt needs a appt, blood in urine , pt has a appt on 1/30 but wants to be seen sooner.....pt was talking to pcp and doctor suggested pt see a gi doctor  please advise thank you

## 2023-01-03 NOTE — TELEPHONE ENCOUNTER
----- Message from Iliana Mcgrath sent at 1/3/2023  4:13 PM CST -----  Regarding: Advise  Contact: Patient  Type: Needs Medical Advice  Who Called:  patient  Symptoms (please be specific):  dark stool and blood with diaherra   How long has patient had these symptoms:   Pharmacy name and phone #:    Best Call Back Number: [phone] (home)   Additional Information: Please call to advise. And also wants to know if you received the order from lab core Thanks!

## 2023-01-04 ENCOUNTER — TELEPHONE (OUTPATIENT)
Dept: CARDIOLOGY | Facility: CLINIC | Age: 88
End: 2023-01-04
Payer: OTHER GOVERNMENT

## 2023-01-04 ENCOUNTER — TELEPHONE (OUTPATIENT)
Dept: GASTROENTEROLOGY | Facility: CLINIC | Age: 88
End: 2023-01-04
Payer: OTHER GOVERNMENT

## 2023-01-04 NOTE — TELEPHONE ENCOUNTER
----- Message from Kathy Guevara LPN sent at 1/3/2023  5:07 PM CST -----    ----- Message -----  From: Caroline Anderson  Sent: 1/3/2023   4:58 PM CST  To: Jose A GARCIAS Jr. Staff    Type: Needs Medical Advice  Who Called:  pt wife     Best Call Back Number: [phone]   Additional Information: pt needs a appt, blood in urine , pt has a appt on 1/30 but wants to be seen sooner.....pt was talking to pcp and doctor suggested pt see a gi doctor  please advise thank you

## 2023-01-04 NOTE — TELEPHONE ENCOUNTER
----- Message from Sol Stubbs sent at 1/4/2023 10:46 AM CST -----  Contact: Wife Grace  Type:  Same Day Appointment Request    Caller is requesting a same day appointment.  Caller declined first available appointment listed below.      Name of Caller:  Wife   When is the first available appointment?  01/24  Symptoms:  blood in urine,   Best Call Back Number:  [phone]  Additional Information:   pt having procedure Friday w. Cardiologist Dr Hughes and he wanted to have him seen by GI doctor before this procedure, is there anyway we can assist them? Thank you

## 2023-01-04 NOTE — TELEPHONE ENCOUNTER
----- Message from Cecelia Rand sent at 1/4/2023 11:18 AM CST -----  Contact: Pt's wife/ [phone]  Type:  Needs Medical Advice    Who Called: Pt's wife/ Grace  Would the patient rather a call back or a response via [facility]? call  Best Call Back Number: [phone]   Additional Information: Pt's wife says pt has an angiogram scheduled on 01-. She says it's impossible to see a GI Dr until Jan 30, 2023. Dr Naranjo's office called pt's wife and said that it's more important to get the pt's heart taken care of before he does anything with GI. Pt's wife also says she going to go to the library and fax the labwork that was done on 12/22/2022 to Dr Castellanos. Please call pt's wife to advise.

## 2023-01-04 NOTE — TELEPHONE ENCOUNTER
Returned call to patient's wife, she states that he had a BM with blood in it over the weekend however he is going for an angiogram on Friday and wanted to see if she could get him in prior to 01/16/2023, She was advised that the date she was given is the first available we have, she states that she will leave it as scheduled for now and cancel it if she doesn't need it.

## 2023-01-04 NOTE — TELEPHONE ENCOUNTER
Spoke with wife and informed her Dr. Cooper made aware of labs and will review and we will call with recommendations. Verbalized understanding.

## 2023-01-05 ENCOUNTER — TELEPHONE (OUTPATIENT)
Dept: CARDIOLOGY | Facility: CLINIC | Age: 88
End: 2023-01-05
Payer: OTHER GOVERNMENT

## 2023-01-05 ENCOUNTER — OFFICE VISIT (OUTPATIENT)
Dept: GASTROENTEROLOGY | Facility: CLINIC | Age: 88
End: 2023-01-05
Payer: MEDICARE

## 2023-01-05 VITALS — BODY MASS INDEX: 21.22 KG/M2 | WEIGHT: 132.06 LBS | HEIGHT: 66 IN

## 2023-01-05 DIAGNOSIS — R14.2 ERUCTATION: ICD-10-CM

## 2023-01-05 DIAGNOSIS — D64.9 NORMOCYTIC ANEMIA: ICD-10-CM

## 2023-01-05 DIAGNOSIS — K92.1 HEMATOCHEZIA: Primary | ICD-10-CM

## 2023-01-05 DIAGNOSIS — K92.1 BLACK STOOL: ICD-10-CM

## 2023-01-05 DIAGNOSIS — Z87.898 HISTORY OF SHORTNESS OF BREATH: ICD-10-CM

## 2023-01-05 DIAGNOSIS — K31.819 GASTRIC AND DUODENAL ANGIODYSPLASIA: ICD-10-CM

## 2023-01-05 DIAGNOSIS — R19.8 IRREGULAR BOWEL HABITS: ICD-10-CM

## 2023-01-05 PROCEDURE — 99215 OFFICE O/P EST HI 40 MIN: CPT | Mod: S$GLB,,, | Performed by: NURSE PRACTITIONER

## 2023-01-05 PROCEDURE — 1101F PR PT FALLS ASSESS DOC 0-1 FALLS W/OUT INJ PAST YR: ICD-10-PCS | Mod: CPTII,S$GLB,, | Performed by: NURSE PRACTITIONER

## 2023-01-05 PROCEDURE — 1159F MED LIST DOCD IN RCRD: CPT | Mod: CPTII,S$GLB,, | Performed by: NURSE PRACTITIONER

## 2023-01-05 PROCEDURE — 1101F PT FALLS ASSESS-DOCD LE1/YR: CPT | Mod: CPTII,S$GLB,, | Performed by: NURSE PRACTITIONER

## 2023-01-05 PROCEDURE — 1160F RVW MEDS BY RX/DR IN RCRD: CPT | Mod: CPTII,S$GLB,, | Performed by: NURSE PRACTITIONER

## 2023-01-05 PROCEDURE — 3288F PR FALLS RISK ASSESSMENT DOCUMENTED: ICD-10-PCS | Mod: CPTII,S$GLB,, | Performed by: NURSE PRACTITIONER

## 2023-01-05 PROCEDURE — 3288F FALL RISK ASSESSMENT DOCD: CPT | Mod: CPTII,S$GLB,, | Performed by: NURSE PRACTITIONER

## 2023-01-05 PROCEDURE — 99999 PR PBB SHADOW E&M-EST. PATIENT-LVL V: CPT | Mod: PBBFAC,,, | Performed by: NURSE PRACTITIONER

## 2023-01-05 PROCEDURE — 1159F PR MEDICATION LIST DOCUMENTED IN MEDICAL RECORD: ICD-10-PCS | Mod: CPTII,S$GLB,, | Performed by: NURSE PRACTITIONER

## 2023-01-05 PROCEDURE — 99999 PR PBB SHADOW E&M-EST. PATIENT-LVL V: ICD-10-PCS | Mod: PBBFAC,,, | Performed by: NURSE PRACTITIONER

## 2023-01-05 PROCEDURE — 99215 PR OFFICE/OUTPT VISIT, EST, LEVL V, 40-54 MIN: ICD-10-PCS | Mod: S$GLB,,, | Performed by: NURSE PRACTITIONER

## 2023-01-05 PROCEDURE — 1160F PR REVIEW ALL MEDS BY PRESCRIBER/CLIN PHARMACIST DOCUMENTED: ICD-10-PCS | Mod: CPTII,S$GLB,, | Performed by: NURSE PRACTITIONER

## 2023-01-05 RX ORDER — RIVASTIGMINE 13.3 MG/24H
PATCH, EXTENDED RELEASE TRANSDERMAL
COMMUNITY
End: 2023-01-10

## 2023-01-05 RX ORDER — FLASH GLUCOSE SENSOR
KIT MISCELLANEOUS
COMMUNITY
Start: 2022-12-29 | End: 2023-01-26 | Stop reason: CLARIF

## 2023-01-05 RX ORDER — TRIAMCINOLONE ACETONIDE 1 MG/G
1 OINTMENT TOPICAL 2 TIMES DAILY PRN
COMMUNITY
Start: 2022-11-16 | End: 2023-08-25 | Stop reason: CLARIF

## 2023-01-05 RX ORDER — POLYETHYLENE GLYCOL 3350 17 G/17G
17 POWDER, FOR SOLUTION ORAL DAILY PRN
COMMUNITY

## 2023-01-05 RX ORDER — CLOTRIMAZOLE 1 G/ML
1 SOLUTION TOPICAL NIGHTLY
COMMUNITY

## 2023-01-05 RX ORDER — CETIRIZINE HYDROCHLORIDE 10 MG/1
10 TABLET ORAL DAILY
COMMUNITY
Start: 2022-11-16

## 2023-01-05 RX ORDER — OMEPRAZOLE 20 MG/1
CAPSULE, DELAYED RELEASE ORAL
COMMUNITY
End: 2023-01-05

## 2023-01-05 RX ORDER — PREDNISONE 10 MG/1
10 TABLET ORAL 2 TIMES DAILY
COMMUNITY
Start: 2022-07-12 | End: 2023-01-26 | Stop reason: CLARIF

## 2023-01-05 RX ORDER — SIMETHICONE 125 MG
125 TABLET,CHEWABLE ORAL EVERY 6 HOURS PRN
COMMUNITY

## 2023-01-05 RX ORDER — MEMANTINE HYDROCHLORIDE 10 MG/1
1 TABLET ORAL
COMMUNITY
End: 2023-01-26 | Stop reason: CLARIF

## 2023-01-05 RX ORDER — IPRATROPIUM BROMIDE 21 UG/1
2 SPRAY, METERED NASAL 3 TIMES DAILY
COMMUNITY

## 2023-01-05 RX ORDER — AMOXICILLIN AND CLAVULANATE POTASSIUM 500; 125 MG/1; MG/1
1 TABLET, FILM COATED ORAL 2 TIMES DAILY PRN
COMMUNITY
Start: 2022-07-12 | End: 2023-01-26 | Stop reason: CLARIF

## 2023-01-05 RX ORDER — BLOOD-GLUCOSE METER
1 EACH MISCELLANEOUS 4 TIMES DAILY
COMMUNITY
Start: 2022-12-09

## 2023-01-05 RX ORDER — AZELASTINE 1 MG/ML
1 SPRAY, METERED NASAL DAILY PRN
COMMUNITY

## 2023-01-05 NOTE — TELEPHONE ENCOUNTER
----- Message from Winnie Shi sent at 1/5/2023  1:54 PM CST -----  Contact: pt's wife at [phone]  Type: Needs Medical Advice  Who Called:  pt's wife    Best Call Back Number: [phone]    Additional Information: pt's wife is calling the office stating he went saw [facility] today and was told Dr. Castellanos should look at the after care summary. She also would like a call back to discuss something.  Please advise.

## 2023-01-05 NOTE — PROGRESS NOTES
Subjective:       Patient ID: Shan Warner is a 89 y.o. male Body mass index is 21.31 kg/m².    Chief Complaint: Rectal Bleeding    This patient is new to me.     Patient is scheduled for an angiogram tomorrow. Patient reports he knows he needs a valve replacement but having angiogram to see if he needs a stent as well.  Patient is here with his wife, whom assisted with history.  Reviewed medical records received from patient, summarized below & copies made & given to nurse to be scanned into system:  12/22/2022 CBC WBC 15.4 (H), RBC 3.28 (L), H&H 9.6 & 29.4 (both low), platelets 139 (L); TSH 6.5 (H); CMP  with LFTs alkaline phosphatase 127 (H) otherwise normal LFTs; sed rate WNL    GI Problem  The primary symptoms include diarrhea (had on 1/1/2023), melena (intermittent black stools since mid 12/2022; been taking iron supplements for several years) and hematochezia. Primary symptoms do not include fever, weight loss, fatigue, abdominal pain, nausea, vomiting, hematemesis or dysuria.   The hematochezia began more than 1 week ago (had bright red rectal bleeding on 12/17/2022 & had 1/1/2023). Frequency: small amount of bright red blood on tissue; denies bleeding in between bowel movements. The hematochezia is a recurrent problem.   The illness is also significant for constipation (had some constipation followed by diarrhea on 1/1/2023; bowel movements are currently once every other day; miralax PRN and metamucil OTC PRN). The illness does not include chills, dysphagia or odynophagia. Significant associated medical issues include GERD (reports frequent belching but denies heartburn/GERD; taking protonix 40 mg BID; PAST TREATMENT: prilosec).     Review of Systems   Constitutional:  Negative for appetite change, chills, fatigue, fever and weight loss.   HENT:  Negative for sore throat and trouble swallowing.    Respiratory:  Positive for shortness of breath (chronic intermittent, only with activity; denies  currently). Negative for cough and choking.    Cardiovascular:  Negative for chest pain.   Gastrointestinal:  Positive for anal bleeding, constipation (had some constipation followed by diarrhea on 1/1/2023; bowel movements are currently once every other day; miralax PRN and metamucil OTC PRN), diarrhea (had on 1/1/2023), hematochezia and melena (intermittent black stools since mid 12/2022; been taking iron supplements for several years). Negative for abdominal pain, dysphagia, hematemesis, nausea, rectal pain and vomiting.   Genitourinary:  Negative for difficulty urinating, dysuria and flank pain.   Neurological:  Negative for weakness.       Past Medical History:   Diagnosis Date    Arthritis     Dementia     Diabetes mellitus     Essential hypertension 05/03/2020    HEARING LOSS     Leukemia     Rash      Past Surgical History:   Procedure Laterality Date    A-V CARDIAC PACEMAKER INSERTION Left 12/31/2021    Procedure: INSERTION, CARDIAC PACEMAKER, DUAL CHAMBER;  Surgeon: Pastor Castellanos MD;  Location: [facility] CATH;  Service: Cardiology;  Laterality: Left;    CATARACT EXTRACTION      CHOLECYSTECTOMY      COLONOSCOPY N/A 04/26/2021    Procedure: COLONOSCOPY;  Surgeon: Isma Mari MD;  Location: [facility];  Service: General;  Laterality: N/A; colon polyps per patient report    CYST REMOVAL      CYSTOSCOPY N/A 05/04/2020    Procedure: CYSTOSCOPY;  Surgeon: Slim Mari MD;  Location: [facility] OR;  Service: Urology;  Laterality: N/A;    ESOPHAGOGASTRODUODENOSCOPY N/A 04/25/2021    Procedure: EGD (ESOPHAGOGASTRODUODENOSCOPY);  Surgeon: Isma Mari MD;  Location: [facility];  Service: General;  Laterality: N/A;    ESOPHAGOGASTRODUODENOSCOPY N/A 06/09/2022    Procedure: EGD (ESOPHAGOGASTRODUODENOSCOPY);  Surgeon: Lamin Owens MD;  Location: [facility];  Service: Endoscopy;  Laterality: N/A;    HERNIA REPAIR      X2    JOINT REPLACEMENT Right     shoulder    JOINT REPLACEMENT Right     knee     JOINT REPLACEMENT Left     knee    LITHOTRIPSY      PROSTATE SURGERY      REMOVAL OF BLOOD CLOT N/A 05/04/2020    Procedure: REMOVAL, BLOOD CLOT;  Surgeon: Slim Mari MD;  Location: [facility] OR;  Service: Urology;  Laterality: N/A;    TONSILLECTOMY      TRANSURETHRAL RESECTION OF PROSTATE N/A 05/04/2020    Procedure: TURP (TRANSURETHRAL RESECTION OF PROSTATE);  Surgeon: Slim Mari MD;  Location: [facility] OR;  Service: Urology;  Laterality: N/A;     Family History   Problem Relation Age of Onset    Cancer Sister         lung, brain    Cancer Brother     Diabetes Maternal Aunt     Cancer Other         ovarian    Colon cancer Neg Hx     Crohn's disease Neg Hx     Esophageal cancer Neg Hx     Ulcerative colitis Neg Hx     Stomach cancer Neg Hx      Social History     Tobacco Use    Smoking status: Former     Types: Pipe    Smokeless tobacco: Never   Substance Use Topics    Alcohol use: Yes     Comment: only for special occasions    Drug use: Never     Wt Readings from Last 10 Encounters:   01/05/23 59.9 kg (132 lb 0.9 oz)   12/14/22 61.4 kg (135 lb 5.8 oz)   12/09/22 59 kg (130 lb)   12/05/22 59.4 kg (130 lb 14.2 oz)   06/13/22 60.1 kg (132 lb 7.9 oz)   06/09/22 58.9 kg (129 lb 13.6 oz)   05/27/22 56.4 kg (124 lb 5.4 oz)   05/04/22 60.8 kg (134 lb)   05/04/22 60.8 kg (134 lb)   04/06/22 61.1 kg (134 lb 11.2 oz)     Lab Results   Component Value Date    WBC 12.20 06/09/2022    HGB 9.1 (L) 06/09/2022    HCT 28.0 (L) 06/09/2022    MCV 92 06/09/2022     (L) 06/09/2022     CMP  Sodium   Date Value Ref Range Status   06/09/2022 140 136 - 145 mmol/L Final     Potassium   Date Value Ref Range Status   06/09/2022 3.9 3.5 - 5.1 mmol/L Final     Chloride   Date Value Ref Range Status   06/09/2022 109 95 - 110 mmol/L Final     CO2   Date Value Ref Range Status   06/09/2022 24 22 - 31 mmol/L Final     Glucose   Date Value Ref Range Status   06/09/2022 174 (H) 70 - 110 mg/dL Final     Comment:     The ADA recommends  the following guidelines for fasting glucose:    Normal:       less than 100 mg/dL    Prediabetes:  100 mg/dL to 125 mg/dL    Diabetes:     126 mg/dL or higher       BUN   Date Value Ref Range Status   06/09/2022 26 (H) 9 - 21 mg/dL Final     Creatinine   Date Value Ref Range Status   06/09/2022 1.07 0.50 - 1.40 mg/dL Final     Calcium   Date Value Ref Range Status   06/09/2022 9.0 8.4 - 10.2 mg/dL Final     Total Protein   Date Value Ref Range Status   06/08/2022 6.8 6.0 - 8.4 g/dL Final     Albumin   Date Value Ref Range Status   06/08/2022 3.8 3.5 - 5.2 g/dL Final     Total Bilirubin   Date Value Ref Range Status   06/08/2022 0.6 0.2 - 1.3 mg/dL Final     Alkaline Phosphatase   Date Value Ref Range Status   06/08/2022 118 38 - 145 U/L Final     AST   Date Value Ref Range Status   06/08/2022 34 17 - 59 U/L Final     ALT   Date Value Ref Range Status   06/08/2022 18 0 - 50 U/L Final     Anion Gap   Date Value Ref Range Status   06/09/2022 7 (L) 8 - 16 mmol/L Final     eGFR if    Date Value Ref Range Status   06/09/2022 >60 >60 mL/min/1.73 m^2 Final     eGFR if non    Date Value Ref Range Status   06/09/2022 >60 >60 mL/min/1.73 m^2 Final     Comment:     Calculation used to obtain the estimated glomerular filtration  rate (eGFR) is the CKD-EPI equation.        Lab Results   Component Value Date    TSH 3.133 09/03/2020     Reviewed prior medical records including radiology report of 6/8/2022 CT abdomen pelvis; & endoscopy history (see surgical history/procedures).    Objective:      Physical Exam  Vitals and nursing note reviewed.   Constitutional:       General: He is not in acute distress.     Appearance: Normal appearance. He is well-developed. He is not diaphoretic.   HENT:      Mouth/Throat:      Lips: Pink. No lesions.      Mouth: Mucous membranes are moist. No oral lesions.      Tongue: No lesions.      Pharynx: Oropharynx is clear. No pharyngeal swelling or posterior  oropharyngeal erythema.   Eyes:      General: No scleral icterus.     Conjunctiva/sclera: Conjunctivae normal.   Pulmonary:      Effort: Pulmonary effort is normal. No respiratory distress.      Breath sounds: Normal breath sounds. No wheezing.   Abdominal:      General: Bowel sounds are normal. There is no distension or abdominal bruit.      Palpations: Abdomen is soft. Abdomen is not rigid. There is no mass.      Tenderness: There is no abdominal tenderness. There is no guarding or rebound. Negative signs include Hawkins's sign and McBurney's sign.   Skin:     General: Skin is warm and dry.      Coloration: Skin is not jaundiced or pale.      Findings: No erythema or rash.   Neurological:      Mental Status: He is alert and oriented to person, place, and time.   Psychiatric:         Behavior: Behavior normal.         Thought Content: Thought content normal.         Judgment: Judgment normal.       Assessment:       1. Hematochezia    2. Irregular bowel habits    3. Black stool    4. History of gastric and duodenal angiodysplasia    5. Eructation    6. Normocytic anemia    7. History of shortness of breath        Plan:     Discussed with patient that we recommend EGD and colonoscopy but we need his cardiologist to clear him before we can schedule him for these procedures. Pending on results of angiogram tomorrow, he may be placed on anticoagulants which we typically need to have held to the EGD and colonoscopy; patient verbalized understanding and patient and his wife report they will call/follow-up with us after his angiogram to update us on his status and to discuss scheduling the EGD and colonoscopy and cardiac clearance.    Hematochezia  - schedule Colonoscopy, discussed procedure with the patient, including risks and benefits, patient verbalized understanding  - discussed about different etiologies that can cause rectal bleeding, such as but not limited to diverticulosis, polyps, colon mass, colon inflammation  or infection, anal fissure or hemorrhoids.   - You may resume normal activity as long as you feel well.  - Avoid/minimize NSAIDs such as ibuprofen (Advil, Motrin) and naproxen (Aleve and Naprosyn).  - Avoid alcohol.    Irregular bowel habits  - schedule Colonoscopy, discussed procedure with the patient, including risks and benefits, patient verbalized understanding  - Recommend high fiber diet (20-30 grams of fiber daily) & CONTINUE OTC fiber supplement daily as directed on packaging, Metamucil.  - CAN CONTINUE OTC MIRALAX 17 GRAMS DAILY PRN AS DIRECTED ON PACKAGING FOR CONSTIPATION    Black stool & History of gastric and duodenal angiodysplasia  - schedule EGD, discussed procedure with patient, including risks and benefits, patient verbalized understanding  - Discussed with patient about possible side effects of bismuth/iron supplementation use, including but not limited to change in stool color; patient verbalized understanding  - avoid/minimize use of NSAIDs- since they can cause GI upset, bleeding and/or ulcers. If NSAID must be taken, recommend take with food.    Eructation  - schedule EGD, discussed procedure with patient, including risks and benefits, patient verbalized understanding  - CONTINUE PROTONIX 40 MG BID AS DIRECTED  - CAN CONTINUE OTC simethicone as directed, such as Gas-x  - recommend low gas diet: Reduce or eliminate these foods from your diet: Broccoli, Cauliflower, [place] sprouts, Cabbage, Cooked dried beans, Carbonated beverages (sparkling water, soda, beer, champagne)  Other Causes Of Excess Gas Include:   1) EATING TOO FAST or TALKING WHILE YOU CHEW may cause you to swallow air. This increases the amount of gas in the stomach and may worsen your symptoms.  --> Chew each mouthful completely before swallowing. Take your time.  2) OVEREATING may increase the feeling of being bloated and cause more gas.  --> When you are full, stop eating.  3) CONSTIPATION can increase the amount of normal  intestinal gas.  --> Avoid constipation by increasing the amount of fiber in your diet by including whole cereal grains, fresh vegetables (except those in the above list) and fresh fruits. High-fiber foods absorb water and carry it out of the body. When increasing the amount of fiber in your diet, you also need to increase the amount of water that you drink. You should drink at least eight 8-ounce glasses of water (two quarts) per day.    Normocytic anemia  - chronic anemia dating back to at least 8/2018; Recommend follow-up with Primary Care Provider/hematology for continued evaluation and management.    History of shortness of breath  - follow-up with PCP/pulmonology for continued evaluation and management ASAP  - if experiencing symptoms of headache, chest pain, severe/persistent shortness of breath, dizziness, and/or blurred vision, recommend going to ER for further evaluation and management    Follow up in about 1 month (around 2/5/2023), or if symptoms worsen or fail to improve.      If no improvement in symptoms or symptoms worsen, call/follow-up at clinic or go to ER.        44 minutes of total time spent on the encounter, which includes face to face time and non-face to face time preparing to see the patient (e.g., review of tests), Obtaining and/or reviewing separately obtained history, Documenting clinical information in the electronic or other health record, Independently interpreting results (not separately reported) and communicating results to the patient/family/caregiver, or Care coordination (not separately reported).

## 2023-01-05 NOTE — PATIENT INSTRUCTIONS
Lower GI Bleeding (Stable)  You have signs of blood in your stool. This is called rectal bleeding. The bleeding may have begun in another part of your gastrointestinal (GI) tract. If the blood is bright red, it is likely coming from the lower part of the GI tract. If the blood is black or dark, it might be coming from higher up in the GI tract. Very small amounts of GI bleeding may not be visible and can only be discovered during a test on your stool. Possible causes of lower GI bleeding include:  Hemorrhoids  Anal fissures  Diverticulitis  Inflammatory bowel disease (Crohn's disease or ulcerative colitis)  Polyps (growths) in the intestine  Note: Iron supplements and medicines for diarrhea or upset stomach can cause black stools. Foods such as licorice and red beets can also discolor the stool and be mistaken for bleeding. These are not bleeding and are not a cause for alarm.  Home care  You have not lost a large amount of blood and your condition appears stable at this time. You may resume normal activity as long as you feel well.  Avoid NSAIDs, such as aspirin, ibuprofen, or naproxen. They can irritate the stomach and cause further bleeding. If you are taking these medicines for other medical reasons, talk to your healthcare provider before you stop them.   Follow-up care  Follow up with your healthcare provider as advised. Further tests may be needed to find the cause of your bleeding.  When to seek medical advice  Call your healthcare provider for any of the following:  Large amount of rectal bleeding   Increasing abdominal pain  Weakness, dizziness  Call 911  Get emergency medical care if any of the following occur:  Loss of consciousness  Vomiting blood  Date Last Reviewed: 6/24/2015  © 5092-4884 [employer]. [address] [place] [address], [place] [address]. All rights reserved. This information is not intended as a substitute for professional medical care. Always follow your healthcare professional's  instructions.

## 2023-01-06 ENCOUNTER — TELEPHONE (OUTPATIENT)
Dept: GASTROENTEROLOGY | Facility: CLINIC | Age: 88
End: 2023-01-06
Payer: OTHER GOVERNMENT

## 2023-01-06 ENCOUNTER — TREATMENT PLANNING (OUTPATIENT)
Dept: GASTROENTEROLOGY | Facility: CLINIC | Age: 88
End: 2023-01-06
Payer: OTHER GOVERNMENT

## 2023-01-06 NOTE — PROGRESS NOTES
Received a call from patient's cardiologist, Dr. Castellanos. He informed me that he is giving us cardiac clearance on this patient to have EGD and colonoscopy soon to address GI bleeding and once these procedures are completed he plans on scheduling patient for a valve replacement. Please schedule this patient for an EGD & colonoscopy to be done at [facility] (within the next couple of weeks). Forwarded to nursing staff and Dr. Rodriguez.  Thanks  [facility]

## 2023-01-06 NOTE — TELEPHONE ENCOUNTER
Pt is added to wait list for cancellation for sooner appointment. Pt's wife verbalized understanding

## 2023-01-09 ENCOUNTER — TELEPHONE (OUTPATIENT)
Dept: CARDIOLOGY | Facility: CLINIC | Age: 88
End: 2023-01-09
Payer: OTHER GOVERNMENT

## 2023-01-09 ENCOUNTER — TELEPHONE (OUTPATIENT)
Dept: GASTROENTEROLOGY | Facility: CLINIC | Age: 88
End: 2023-01-09
Payer: OTHER GOVERNMENT

## 2023-01-09 ENCOUNTER — PATIENT MESSAGE (OUTPATIENT)
Dept: GASTROENTEROLOGY | Facility: CLINIC | Age: 88
End: 2023-01-09
Payer: OTHER GOVERNMENT

## 2023-01-09 NOTE — TELEPHONE ENCOUNTER
----- Message from Mary Espinoza sent at 1/9/2023 10:33 AM CST -----  Type: Patient Call Back         Who called: Wife         What is the request in detail: calling to sched 2 week post op f/u; procedure done on 1/6 for a Angiogram, Coronary, with Left Heart Cath [[id number]]; please advise         Best call back number: [phone]         Additional Information:            Thank You

## 2023-01-09 NOTE — TELEPHONE ENCOUNTER
----- Message from Iliana Mcgrath sent at 1/9/2023  4:23 PM CST -----  Regarding: Advise  Contact: Wife  Type: Needs Medical Advice  Who Called:  WifeGrace   Symptoms (please be specific): coloscopy and throat procedure    How long has patient had these symptoms:    Pharmacy name and phone #:    Best Call Back Number: [phone] (home)     Additional Information: Wife states Dr. Naranjo office scheduled this January 12,2023 a coloscopy and throat procedure.  She wants to know if the doctor ed approves. Please call to advise. Thanks!

## 2023-01-10 ENCOUNTER — TELEPHONE (OUTPATIENT)
Dept: CARDIOLOGY | Facility: HOSPITAL | Age: 88
End: 2023-01-10
Payer: OTHER GOVERNMENT

## 2023-01-10 NOTE — TELEPHONE ENCOUNTER
----- Message from Karin Ge RN sent at 1/10/2023 11:06 AM CST -----  Regarding: Pacemaker Info  Good morning,     Mr. Warner is scheduled for an EGD and Colonoscopy under general anesthesia with Dr. Naranjo on Thursday, 1/12/23. Cautery may be used to remove polyps. Please advise how best to manage his pacemaker during this procedure.     Thank you!

## 2023-01-10 NOTE — TELEPHONE ENCOUNTER
PACEMAKERS:  If no electrocautery is planned, nothing needs to be done.  If only electrocautery below the umbilicus is planned, nothing needs to be done.  If electrocautery above the umbilicus is planned, a magnet may be placed over the device during the surgery. A magnet makes a pacemaker's pacing mode asynchronous (the pacer will pace no matter what). Magnet removal returns the pacer function to baseline without any programming required. If the patient is not pacer dependent, application of a magnet is not required.    Biotronik      The patient is pacer dependent and a magnet is required to asynchronous pace.

## 2023-01-12 ENCOUNTER — HOSPITAL ENCOUNTER (OUTPATIENT)
Facility: HOSPITAL | Age: 88
Discharge: HOME | End: 2023-01-12
Attending: INTERNAL MEDICINE | Admitting: INTERNAL MEDICINE
Payer: OTHER GOVERNMENT

## 2023-01-12 ENCOUNTER — ANESTHESIA EVENT (OUTPATIENT)
Dept: ENDOSCOPY | Facility: HOSPITAL | Age: 88
End: 2023-01-12
Payer: OTHER GOVERNMENT

## 2023-01-12 ENCOUNTER — ANESTHESIA (OUTPATIENT)
Dept: ENDOSCOPY | Facility: HOSPITAL | Age: 88
End: 2023-01-12
Payer: OTHER GOVERNMENT

## 2023-01-12 DIAGNOSIS — D64.9 ANEMIA: ICD-10-CM

## 2023-01-12 LAB — GLUCOSE SERPL-MCNC: 206 MG/DL (ref 70–110)

## 2023-01-12 PROCEDURE — 45380 COLONOSCOPY AND BIOPSY: CPT | Mod: 59,PO | Performed by: INTERNAL MEDICINE

## 2023-01-12 PROCEDURE — 37000009 HC ANESTHESIA EA ADD 15 MINS: Mod: PO | Performed by: INTERNAL MEDICINE

## 2023-01-12 PROCEDURE — D9220A PRA ANESTHESIA: Mod: CRNA,,, | Performed by: NURSE ANESTHETIST, CERTIFIED REGISTERED

## 2023-01-12 PROCEDURE — 00813 ANES UPR LWR GI NDSC PX: CPT | Mod: PO | Performed by: INTERNAL MEDICINE

## 2023-01-12 PROCEDURE — 27201012 HC FORCEPS, HOT/COLD, DISP: Mod: PO | Performed by: INTERNAL MEDICINE

## 2023-01-12 PROCEDURE — 45380 COLONOSCOPY AND BIOPSY: CPT | Mod: 59,,, | Performed by: INTERNAL MEDICINE

## 2023-01-12 PROCEDURE — D9220A PRA ANESTHESIA: Mod: ANES,,, | Performed by: ANESTHESIOLOGY

## 2023-01-12 PROCEDURE — 45380 PR COLONOSCOPY,BIOPSY: ICD-10-PCS | Mod: 59,,, | Performed by: INTERNAL MEDICINE

## 2023-01-12 PROCEDURE — 45388 COLONOSCOPY W/ABLATION: CPT | Mod: ,,, | Performed by: INTERNAL MEDICINE

## 2023-01-12 PROCEDURE — 25000003 PHARM REV CODE 250: Mod: PO | Performed by: INTERNAL MEDICINE

## 2023-01-12 PROCEDURE — 43239 EGD BIOPSY SINGLE/MULTIPLE: CPT | Mod: PO | Performed by: INTERNAL MEDICINE

## 2023-01-12 PROCEDURE — 43239 PR EGD, FLEX, W/BIOPSY, SGL/MULTI: ICD-10-PCS | Mod: 51,,, | Performed by: INTERNAL MEDICINE

## 2023-01-12 PROCEDURE — 45388: ICD-10-PCS | Mod: ,,, | Performed by: INTERNAL MEDICINE

## 2023-01-12 PROCEDURE — D9220A PRA ANESTHESIA: ICD-10-PCS | Mod: CRNA,,, | Performed by: NURSE ANESTHETIST, CERTIFIED REGISTERED

## 2023-01-12 PROCEDURE — 63600175 PHARM REV CODE 636 W HCPCS: Mod: PO | Performed by: INTERNAL MEDICINE

## 2023-01-12 PROCEDURE — 45388 COLONOSCOPY W/ABLATION: CPT | Mod: PO | Performed by: INTERNAL MEDICINE

## 2023-01-12 PROCEDURE — 63600175 PHARM REV CODE 636 W HCPCS: Mod: PO | Performed by: NURSE ANESTHETIST, CERTIFIED REGISTERED

## 2023-01-12 PROCEDURE — 88305 TISSUE EXAM BY PATHOLOGIST: CPT | Mod: 59 | Performed by: STUDENT IN AN ORGANIZED HEALTH CARE EDUCATION/TRAINING PROGRAM

## 2023-01-12 PROCEDURE — 88305 TISSUE EXAM BY PATHOLOGIST: ICD-10-PCS | Mod: 26,,, | Performed by: STUDENT IN AN ORGANIZED HEALTH CARE EDUCATION/TRAINING PROGRAM

## 2023-01-12 PROCEDURE — 88305 TISSUE EXAM BY PATHOLOGIST: CPT | Mod: 26,,, | Performed by: STUDENT IN AN ORGANIZED HEALTH CARE EDUCATION/TRAINING PROGRAM

## 2023-01-12 PROCEDURE — 25000003 PHARM REV CODE 250: Mod: PO | Performed by: NURSE ANESTHETIST, CERTIFIED REGISTERED

## 2023-01-12 PROCEDURE — D9220A PRA ANESTHESIA: ICD-10-PCS | Mod: ANES,,, | Performed by: ANESTHESIOLOGY

## 2023-01-12 PROCEDURE — 37000008 HC ANESTHESIA 1ST 15 MINUTES: Mod: PO | Performed by: INTERNAL MEDICINE

## 2023-01-12 PROCEDURE — 43239 EGD BIOPSY SINGLE/MULTIPLE: CPT | Mod: 51,,, | Performed by: INTERNAL MEDICINE

## 2023-01-12 RX ORDER — CLINDAMYCIN PHOSPHATE 600 MG/50ML
600 INJECTION, SOLUTION INTRAVENOUS ONCE
Status: COMPLETED | OUTPATIENT
Start: 2023-01-12 | End: 2023-01-12

## 2023-01-12 RX ORDER — PROPOFOL 10 MG/ML
VIAL (ML) INTRAVENOUS
Status: DISCONTINUED | OUTPATIENT
Start: 2023-01-12 | End: 2023-01-12

## 2023-01-12 RX ORDER — PHENYLEPHRINE HYDROCHLORIDE 10 MG/ML
INJECTION INTRAVENOUS
Status: DISCONTINUED | OUTPATIENT
Start: 2023-01-12 | End: 2023-01-12

## 2023-01-12 RX ORDER — SODIUM CHLORIDE 0.9 % (FLUSH) 0.9 %
10 SYRINGE (ML) INJECTION
Status: DISCONTINUED | OUTPATIENT
Start: 2023-01-12 | End: 2023-01-12 | Stop reason: HOSPADM

## 2023-01-12 RX ORDER — PROPOFOL 10 MG/ML
VIAL (ML) INTRAVENOUS CONTINUOUS PRN
Status: DISCONTINUED | OUTPATIENT
Start: 2023-01-12 | End: 2023-01-12

## 2023-01-12 RX ORDER — SODIUM CHLORIDE, SODIUM LACTATE, POTASSIUM CHLORIDE, CALCIUM CHLORIDE 600; 310; 30; 20 MG/100ML; MG/100ML; MG/100ML; MG/100ML
INJECTION, SOLUTION INTRAVENOUS CONTINUOUS
Status: DISCONTINUED | OUTPATIENT
Start: 2023-01-12 | End: 2023-01-12 | Stop reason: HOSPADM

## 2023-01-12 RX ORDER — LIDOCAINE HCL/PF 100 MG/5ML
SYRINGE (ML) INTRAVENOUS
Status: DISCONTINUED | OUTPATIENT
Start: 2023-01-12 | End: 2023-01-12

## 2023-01-12 RX ADMIN — PROPOFOL 20 MG: 10 INJECTION, EMULSION INTRAVENOUS at 08:01

## 2023-01-12 RX ADMIN — PHENYLEPHRINE HYDROCHLORIDE 50 MCG: 10 INJECTION, SOLUTION INTRAMUSCULAR; INTRAVENOUS; SUBCUTANEOUS at 09:01

## 2023-01-12 RX ADMIN — PROPOFOL 20 MG: 10 INJECTION, EMULSION INTRAVENOUS at 09:01

## 2023-01-12 RX ADMIN — CLINDAMYCIN IN 5 PERCENT DEXTROSE 600 MG: 12 INJECTION, SOLUTION INTRAVENOUS at 08:01

## 2023-01-12 RX ADMIN — LIDOCAINE HYDROCHLORIDE 100 MG: 20 INJECTION, SOLUTION INTRAVENOUS at 08:01

## 2023-01-12 RX ADMIN — PHENYLEPHRINE HYDROCHLORIDE 50 MCG: 10 INJECTION, SOLUTION INTRAMUSCULAR; INTRAVENOUS; SUBCUTANEOUS at 08:01

## 2023-01-12 RX ADMIN — PROPOFOL 30 MG: 10 INJECTION, EMULSION INTRAVENOUS at 08:01

## 2023-01-12 RX ADMIN — SODIUM CHLORIDE, POTASSIUM CHLORIDE, SODIUM LACTATE AND CALCIUM CHLORIDE: 600; 310; 30; 20 INJECTION, SOLUTION INTRAVENOUS at 07:01

## 2023-01-12 RX ADMIN — PROPOFOL 75 MCG/KG/MIN: 10 INJECTION, EMULSION INTRAVENOUS at 08:01

## 2023-01-12 NOTE — [employer] PATIENT INSTRUCTIONS
Discharge Summary/Instructions for after Colonoscopy with   Biopsy/Polypectomy  Shan Warner    Thursday, January 12, 2023  Tyree Naranjo MD  RESTRICTIONS ON ACTIVITY:  - Do not drive a car or operate machinery until the day after the procedure.      - The following day: return to full activity including work.  - For  3 days: No heavy lifting, straining or running.  - Diet: You can have solid foods, but no gassy foods (i.e. beans, broccoli,   cabbage, etc).  TREATMENT FOR COMMON SIDE EFFECTS:  - Mild abdominal pain and bloating or excessive gas: rest, eat lightly and   use a heating pad.  SYMPTOMS TO WATCH FOR AND REPORT TO YOUR PHYSICIAN:  1. Severe abdominal pain.  2. Fever within 24 hours after a procedure.  3. A large amount of rectal bleeding. (A small amount of blood from the   rectum is not serious, especially if hemorrhoids are present.  3.  Because air was put into your colon during the procedure, expelling   large amounts of air from your rectum is normal.  4.  You may not have a bowel movement for 1-3 days because of the   colonoscopy prep.  This is normal.  5.  Call immediately if you notice any of the following:   Chills and/or fever over 101   Persistent vomiting   Severe abdominal pain, other than gas cramps   Severe chest pain   Black, tarry stools   Any bleeding - exceeding one tablespoon  Your doctor recommends these additional instructions:  You are being discharged to home.   Eat a high fiber diet.   Take a fiber supplement, for example Citrucel, Fibercon, Konsyl or   Metamucil.   Continue your present medications.   Return to your referring physician, Dr. Cooper, as previously scheduled.   None  If you have any questions or problems, please call your physician.  EMERGENCY PHONE NUMBER: [phone]  LAB RESULTS: Call in two (2) weeks for lab results, [phone]  ___________________________________________  Nurse  Signature  ___________________________________________  Patient/Designated Responsible Party Signature  Tyree Naranjo MD  1/12/2023 9:52:08 AM  This report has been verified and signed electronically.  Dear patient,  As a result of recent federal legislation (The Federal Cures Act), you may   receive lab or pathology results from your procedure in your [facility]   account before your physician is able to contact you. Your physician or   their representative will relay the results to you with their   recommendations at their soonest availability.  Thank you.  [employer]

## 2023-01-12 NOTE — ANESTHESIA PREPROCEDURE EVALUATION
01/12/2023  Shan Warner is a 89 y.o., male.      Pre-op Assessment    I have reviewed the Patient Summary Reports.     I have reviewed the Nursing Notes. I have reviewed the NPO Status.   I have reviewed the Medications.     Review of Systems  Anesthesia Hx:  No problems with previous Anesthesia    Social:  Former Smoker    Hematology/Oncology:         -- Anemia: Hematology Comments: Hgb = 8.6  Chronic leukemia   EENT/Dental:   Hearing impaired   Cardiovascular:   Pacemaker Hypertension Dysrhythmias CHOWDHURY · The left ventricle is normal in size with normal systolic function.  · The estimated ejection fraction is 60%.  · Grade II left ventricular diastolic dysfunction.  · Normal right ventricular size with normal right ventricular systolic function.  · Severe left atrial enlargement.  · There is moderate aortic valve stenosis.  · Aortic valve area is 1.31 cm2; peak velocity is 3.03 m/s; mean gradient is 21 mmHg.  · Mild mitral regurgitation.  · Normal central venous pressure (3 mmHg).  · The estimated PA systolic pressure is 31 mmHg.  Bradycardia    12/2022 - cardiac cath - RCA 70-80% lesion   Pulmonary:   Shortness of breath    Renal/:   renal calculi    Hepatic/GI:   GI bleed history, melena   Musculoskeletal:   Arthritis     Neurological:   Dementia   Endocrine:   Diabetes, type 2, using insulin    Psych:   Psychiatric History anxiety          Physical Exam  General: Well nourished, Cooperative, Alert and Oriented    Airway:  Mallampati: I   Mouth Opening: Normal  TM Distance: Normal  Tongue: Normal  Neck ROM: Normal ROM    Dental:  Intact    Chest/Lungs:  Clear to auscultation, Normal Respiratory Rate    Heart:  Rate: Normal  Rhythm: Regular Rhythm        Anesthesia Plan  Type of Anesthesia, risks & benefits discussed:    Anesthesia Type: Gen Natural Airway  Intra-op Monitoring Plan: Standard ASA  Monitors  Induction:  IV  Informed Consent: Informed consent signed with the Patient and all parties understand the risks and agree with anesthesia plan.  All questions answered.   ASA Score: 3  Day of Surgery Review of History & Physical: I have interviewed and examined the patient. I have reviewed the patient's H&P dated:   Anesthesia Plan Notes: Patient understands and accepts the risk of dental injury.  Discussed risk of obstructive sleep apnea with patient.  Advised to follow up with primary care physician.        Ready For Surgery From Anesthesia Perspective.     .

## 2023-01-12 NOTE — PATIENT INSTRUCTIONS
Recovery After Procedural Sedation (Adult)   You have been given medicine by vein to make you sleep during your surgery. This may have included both a pain medicine and sleeping medicine. Most of the effects have worn off. But you may still have some drowsiness for the next 6 to 8 hours.  Home care  Follow these guidelines when you get home:  For the next 8 hours, you should be watched by a responsible adult. This person should make sure your condition is not getting worse.  Don't drink any alcohol for the next 24 hours.  Don't drive, operate dangerous machinery, or make important business or personal decisions during the next 24 hours.  To prevent injury or falls, use caution when standing and walking for at least 24 hours after your procedure.  Note: Your healthcare provider may tell you not to take any medicine by mouth for pain or sleep in the next 4 hours. These medicines may react with the medicines you were given in the hospital. This could cause a much stronger response than usual.  Follow-up care  Follow up with your healthcare provider if you are not alert and back to your usual level of activity within 12 hours.  When to seek medical advice  Call your healthcare provider right away if any of these occur:  Drowsiness gets worse  Weakness or dizziness gets worse  Repeated vomiting  You can't be awakened  Fever  New rash  [employer] last reviewed this educational content on 9/1/2019  © 9960-6643 [employer]. [address] [place] [address], [place] [address]. All rights reserved. This information is not intended as a substitute for professional medical care. Always follow your healthcare professional's instructions         Tips to Control Acid Reflux    To control acid reflux, youll need to make some basic diet and lifestyle changes. The simple steps outlined below may be all youll need to ease discomfort.  Watch what you eat  Avoid fatty foods and spicy foods.  Eat fewer acidic foods, such as citrus  and tomato-based foods. These can increase symptoms.  Limit drinking alcohol, caffeine, and fizzy beverages. All increase acid reflux.  Try limiting chocolate, peppermint, and spearmint. These can worsen acid reflux in some people.  Watch when you eat  Avoid lying down for 3 hours after eating.  Do not snack before going to bed.  Raise your head  Raising your head and upper body by 4 to 6 inches helps limit reflux when youre lying down. Put blocks under the head of your bed frame to raise it.  Other changes  Lose weight, if you need to  Dont exercise near bedtime  Avoid tight-fitting clothes  Limit aspirin and ibuprofen  Stop smoking   Date Last Reviewed: 7/1/2016  © 3276-4468 [employer]. [address] [place] [address], [place] [address]. All rights reserved. This information is not intended as a substitute for professional medical care. Always follow your healthcare professional's instructions.         High-Fiber Diet  Fiber is in fruits, vegetables, cereals, and grains. Fiber passes through your body undigested. A high-fiber diet helps food move through your intestinal tract. The added bulk is helpful in preventing constipation. In people with diverticulosis, fiber helps clean out the pouches along the colon wall. It also prevents new pouches from forming. A high-fiber diet reduces the risk of colon cancer. It also lowers blood cholesterol and prevents high blood sugar in people with diabetes.    The fiber-rich foods listed below should be part of your diet. If you are not used to high-fiber foods, start with 1 or 2 foods from this list. Every 3 to 4 days add a new one to your diet. Do this until you are eating 4 high-fiber foods per day. This should give you 20 to 35 grams of fiber a day. It is also important to drink a lot of water when you are on this diet. You should have 6 to 8 glasses of water a day. Water makes the fiber swell and increases the benefit.  Foods high in dietary fiber  The following  foods are high in dietary fiber:  Breads. Breads made with 100% whole-wheat flour; david, wheat, or rye crackers; whole-grain tortillas, bran muffins.  Cereals. Whole-grain and bran cereals with bran (shredded wheat, wheat flakes, raisin bran, corn bran); oatmeal, rolled oats, granola, and brown rice.  Fruits. Fresh fruits and their edible skins (pears, prunes, raisins, berries, apples, and apricots); bananas, citrus fruit, mangoes, pineapple; and prune juice.  Nuts. Any nuts and seeds.  Vegetables. Best served raw or lightly cooked. All types, especially: green peas, celery, eggplant, potatoes, spinach, broccoli, [place] sprouts, winter squash, carrots, cauliflower, soybeans, lentils, and fresh and dried beans of all kinds.  Other. Popcorn, any spices.  Date Last Reviewed: 8/1/2016  © 8082-0062 [employer]. [address] [place] [id number]. All rights reserved. This information is not intended as a substitute for professional medical care. Always follow your healthcare professional's instructions.

## 2023-01-12 NOTE — BRIEF OP NOTE
Discharge Note  Short Stay      SUMMARY     Admit Date: 1/12/2023    Attending Physician: Tyree Naranjo Jr., MD     Discharge Physician: Tyree Naranjo Jr., MD    Discharge Date: 1/12/2023 9:54 AM    Final Diagnosis: Black stools [K92.1]  History of gastroesophageal reflux (GERD) [Z87.19]  Hematochezia [K92.1]  Constipation, unspecified constipation type [K59.00]      Impression:            - Normal oropharynx.                          - Normal cricopharyngeus.                          - Normal esophagus.                          - Z-line regular, 36 cm from the incisors.                          - Small hiatal hernia.                          - Multiple fundic gland polyps.                          - Minimal gastritis.                          - Normal antrum and prepyloric region of the                          stomach. Biopsied.                          - Normal pylorus.                          - Normal examined duodenum.                          - Normal major papilla.   Recommendation:        - Perform a colonoscopy as previously scheduled.                          - Discharge patient to home after that.                          - Await pathology results.                          - Continue present medications.                          - Use Protonix (pantoprazole) 40 mg PO daily.                          - Follow up with Dr. Cooper.     Impression:            - Two non-bleeding colonic angioectasias. Treated                          with a monopolar probe.                          - Two 2 mm polyps at the hepatic flexure, removed                          piecemeal using a cold biopsy forceps. Resected                          and retrieved.                          - Diverticulosis in the proximal ascending colon.                          - Non-bleeding internal hemorrhoids.                          - The examination was otherwise normal.                          - The anus is normal.                           - The examined portion of the ileum was normal.   Recommendation:        - Discharge patient to home.                          - High fiber diet.                          - Use fiber, for example Citrucel, Fibercon,                          Konsyl or Metamucil.                          - Continue present medications.                          - Return to referring physician as previously                          scheduled.                          - Repeat colonoscopy PRN for retreatment.                                   Tyree Naranjo MD   1/12/2023       Disposition: HOME OR SELF CARE    Patient Instructions:   Current Discharge Medication List        CONTINUE these medications which have NOT CHANGED    Details   amoxicillin-clavulanate 500-125mg (AUGMENTIN) 500-125 mg Tab Take 1 tablet by mouth 2 (two) times daily as needed (dental appointments).      azelastine (ASTELIN) 137 mcg (0.1 %) nasal spray INHALE 1 SPRAY IN EACH NOSTRIL TWICE A DAY FOR ALLERGIES      azelastine-fluticasone (DYMISTA) 137-50 mcg/spray Spry nassal spray 1 spray by Nasal route daily as needed (congestion).       budesonide-formoterol 160-4.5 mcg (SYMBICORT) 160-4.5 mcg/actuation HFAA Inhale 2 puffs into the lungs every 12 (twelve) hours as needed (shortness of breath). Controller      cetirizine (ZYRTEC) 10 MG tablet 10 mg.      cyanocobalamin, vitamin B-12, 5,000 mcg Subl Take 1 tablet by mouth once daily.       donepeziL (ARICEPT) 5 MG tablet Take 5 mg by mouth every evening.      ferrous gluconate (FERGON) 324 MG tablet Take 324 mg by mouth 2 (two) times a day.      finasteride (PROSCAR) 5 mg tablet Take 5 mg by mouth every evening.      folic acid (FOLVITE) 1 MG tablet Take 1 mg by mouth once daily.       furosemide (LASIX) 20 MG tablet Take 1 tablet (20 mg total) by mouth every other day.  Qty: 15 tablet, Refills: 11      gemfibrozil (LOPID) 600 MG tablet Take 600 mg by mouth 2 (two) times daily.       ipratropium (ATROVENT)  21 mcg (0.03 %) nasal spray INHALE 2 SPRAYS IN EACH NOSTRIL FOUR TIMES A DAY AS NEEDED FOR ALLERGIES TAKE 20 MINUTES BEFORE EATING TO HELP WITH RUNNY NOSE. TAKE AS NEEDED.      magnesium 30 mg Tab Take by mouth once.      melatonin (MELATIN) 3 mg tablet Take 2 tablets (6 mg total) by mouth nightly as needed for Insomnia.  Qty: 30 tablet, Refills: 3      metoprolol succinate (TOPROL-XL) 25 MG 24 hr tablet Take 0.5 tablets (12.5 mg total) by mouth every evening.  Qty: 15 tablet, Refills: 11    Comments: .      multivitamin (THERAGRAN) per tablet Take 1 tablet by mouth once daily.      nateglinide (STARLIX) 120 MG tablet Take 120 mg by mouth 3 (three) times daily before meals.      pantoprazole (PROTONIX) 40 MG tablet Take 40 mg by mouth 2 (two) times daily.      potassium chloride (KLOR-CON) 10 MEQ TbSR Take 1 tablet (10 mEq total) by mouth every other day.  Qty: 15 tablet, Refills: 11      predniSONE (DELTASONE) 10 MG tablet Take 10 mg by mouth 2 (two) times daily.      propylene glycol/peg 400/PF (SYSTANE, PF, OPHT) Place 1 drop into both eyes 2 (two) times a day.      SITagliptin (JANUVIA) 50 MG Tab Take 50 mg by mouth once daily.      tamsulosin (FLOMAX) 0.4 mg Cap Take 1 capsule by mouth with lunch.      vitamin D (VITAMIN D3) 1000 units Tab Take 1,000 Units by mouth once daily.      camphor-menthol 0.5-0.5% (SARNA) lotion APPLY MODERATE AMOUNT TOPICALLY TWICE A DAY      clotrimazole (LOTRIMIN) 1 % Soln APPLY SMALL AMOUNT TOPICALLY ONCE DAILY FOR FUNGAL INFECTION APPLY TO TOENAILS      FREESTYLE SAVANNA 2 SENSOR Kit       guaiFENesin (MUCINEX) 600 mg 12 hr tablet Take 1,200 mg by mouth 2 (two) times daily as needed for Congestion.      HYDROPHILIC CREAM TOP APPLY MODERATE AMOUNT TOPICALLY TWICE A DAY AS NEEDED FOR DRY SKIN      insulin glargine,hum.rec.anlog (BASAGLAR KWIKPEN U-100 INSULIN SUBQ) Inject 15 Units into the skin every evening.      insulin lispro 100 unit/mL pen Inject 18-19 Units into the skin before  meals as needed (HIGH BLOOD SUGAR VIA SLIDING SCALE). **PATIENT DOESN'T KNOW SLIDING SCALE DETAILS COMPLETELY**      memantine (NAMENDA) 10 MG Tab 1 tablet.      ONETOUCH VERIO TEST STRIPS Strp SMARTSIG:Via Meter      peg 400-propylene glycol 0.4-0.3 % Drop INSTILL ONE DROP IN EACH EYE FOUR TIMES A DAY AS NEEDED FOR DRY EYES      polyethylene glycol (GLYCOLAX) 17 gram/dose powder Take 17 g by mouth daily as needed.      psyllium 0.52 gram capsule Take 0.52 g by mouth daily as needed.      simethicone (MYLICON) 125 MG chewable tablet Take 125 mg by mouth every 6 (six) hours as needed for Flatulence.      triamcinolone acetonide 0.1% (KENALOG) 0.1 % ointment APPLY MODERATE AMOUNT TOPICALLY TWICE A DAY FOR INFLAMMATION  USE FOR MOST SEVERE ITCHY AREA FOR 1-2 WEEKS, DO NOT USE ON FACE, AXILLA  AND GROIN FOR INFLAMMATION  USE FOR MOST SEVERE ITCHY AREA FOR 1-2 WEEKS, DO NOT USE ON FACE, AXILLA   AND GROIN             Discharge Procedure Orders (must include Diet, Follow-up, Activity)    Follow Up:  Follow up with PCP as per your routine.  Please follow an anti reflux diet and a high fiber diet.  Activity as tolerated.    No driving day of procedure.

## 2023-01-12 NOTE — H&P
History & Physical - Short Stay  Gastroenterology      SUBJECTIVE:     Procedure: Gastroscopy and Colonoscopy    Chief Complaint/Indication for Procedure:  Anemia.  GI Bleed.  (Hematochezia.  Melena.)    History of Present Illness:  See recent GI OV note:  Office Visit   1/5/2023  [place] - G[facility]enterology  CRISTEL Vogel  Gastroenterology Hematochezia +6 more  Dx Rectal Bleeding; Referred by Aaareferral Self  Reason for Visit     Progress Notes    CRISTEL Vogel at 1/5/2023  1:00 PM    Status: Signed   Subjective:       Patient ID: Shan Warner is a 89 y.o. male Body mass index is 21.31 kg/m².     Chief Complaint: Rectal Bleeding     This patient is new to me.     Patient is scheduled for an angiogram tomorrow. Patient reports he knows he needs a valve replacement but having angiogram to see if he needs a stent as well.  Patient is here with his wife, whom assisted with history.  Reviewed medical records received from patient, summarized below & copies made & given to nurse to be scanned into system:  12/22/2022 CBC WBC 15.4 (H), RBC 3.28 (L), H&H 9.6 & 29.4 (both low), platelets 139 (L); TSH 6.5 (H); CMP  with LFTs alkaline phosphatase 127 (H) otherwise normal LFTs; sed rate WNL     GI Problem  The primary symptoms include diarrhea (had on 1/1/2023), melena (intermittent black stools since mid 12/2022; been taking iron supplements for several years) and hematochezia. Primary symptoms do not include fever, weight loss, fatigue, abdominal pain, nausea, vomiting, hematemesis or dysuria.   The hematochezia began more than 1 week ago (had bright red rectal bleeding on 12/17/2022 & had 1/1/2023). Frequency: small amount of bright red blood on tissue; denies bleeding in between bowel movements. The hematochezia is a recurrent problem.   The illness is also significant for constipation (had some constipation followed by diarrhea on 1/1/2023; bowel movements are currently once every other day;  miralax PRN and metamucil OTC PRN). The illness does not include chills, dysphagia or odynophagia. Significant associated medical issues include GERD (reports frequent belching but denies heartburn/GERD; taking protonix 40 mg BID; PAST TREATMENT: prilosec).      Review of Systems   Constitutional:  Negative for appetite change, chills, fatigue, fever and weight loss.   HENT:  Negative for sore throat and trouble swallowing.    Respiratory:  Positive for shortness of breath (chronic intermittent, only with activity; denies currently). Negative for cough and choking.    Cardiovascular:  Negative for chest pain.   Gastrointestinal:  Positive for anal bleeding, constipation (had some constipation followed by diarrhea on 1/1/2023; bowel movements are currently once every other day; miralax PRN and metamucil OTC PRN), diarrhea (had on 1/1/2023), hematochezia and melena (intermittent black stools since mid 12/2022; been taking iron supplements for several years). Negative for abdominal pain, dysphagia, hematemesis, nausea, rectal pain and vomiting.     Wt Readings from Last 10 Encounters:   01/05/23 59.9 kg (132 lb 0.9 oz)   12/14/22 61.4 kg (135 lb 5.8 oz)   12/09/22 59 kg (130 lb)   12/05/22 59.4 kg (130 lb 14.2 oz)   06/13/22 60.1 kg (132 lb 7.9 oz)   06/09/22 58.9 kg (129 lb 13.6 oz)   05/27/22 56.4 kg (124 lb 5.4 oz)   05/04/22 60.8 kg (134 lb)   05/04/22 60.8 kg (134 lb)   04/06/22 61.1 kg (134 lb 11.2 oz)      Assessment:       1. Hematochezia    2. Irregular bowel habits    3. Black stool    4. History of gastric and duodenal angiodysplasia    5. Eructation    6. Normocytic anemia    7. History of shortness of breath        Plan:     Discussed with patient that we recommend EGD and colonoscopy but we need his cardiologist to clear him before we can schedule him for these procedures. Pending on results of angiogram tomorrow, he may be placed on anticoagulants which we typically need to have held to the EGD and  colonoscopy; patient verbalized understanding and patient and his wife report they will call/follow-up with us after his angiogram to update us on his status and to discuss scheduling the EGD and colonoscopy and cardiac clearance.     Hematochezia  - schedule Colonoscopy, discussed procedure with the patient, including risks and benefits, patient verbalized understanding  - discussed about different etiologies that can cause rectal bleeding, such as but not limited to diverticulosis, polyps, colon mass, colon inflammation or infection, anal fissure or hemorrhoids.   - You may resume normal activity as long as you feel well.  - Avoid/minimize NSAIDs such as ibuprofen (Advil, Motrin) and naproxen (Aleve and Naprosyn).  - Avoid alcohol.     Irregular bowel habits  - schedule Colonoscopy, discussed procedure with the patient, including risks and benefits, patient verbalized understanding  - Recommend high fiber diet (20-30 grams of fiber daily) & CONTINUE OTC fiber supplement daily as directed on packaging, Metamucil.  - CAN CONTINUE OTC MIRALAX 17 GRAMS DAILY PRN AS DIRECTED ON PACKAGING FOR CONSTIPATION     Black stool & History of gastric and duodenal angiodysplasia  - schedule EGD, discussed procedure with patient, including risks and benefits, patient verbalized understanding  - Discussed with patient about possible side effects of bismuth/iron supplementation use, including but not limited to change in stool color; patient verbalized understanding  - avoid/minimize use of NSAIDs- since they can cause GI upset, bleeding and/or ulcers. If NSAID must be taken, recommend take with food.     Eructation  - schedule EGD, discussed procedure with patient, including risks and benefits, patient verbalized understanding  - CONTINUE PROTONIX 40 MG BID AS DIRECTED  - CAN CONTINUE OTC simethicone as directed, such as Gas-x  - recommend low gas diet: Reduce or eliminate these foods from your diet: Broccoli, Cauliflower, [place]  sprouts, Cabbage, Cooked dried beans, Carbonated beverages (sparkling water, soda, beer, champagne)  Other Causes Of Excess Gas Include:   1) EATING TOO FAST or TALKING WHILE YOU CHEW may cause you to swallow air. This increases the amount of gas in the stomach and may worsen your symptoms.  --> Chew each mouthful completely before swallowing. Take your time.  2) OVEREATING may increase the feeling of being bloated and cause more gas.  --> When you are full, stop eating.  3) CONSTIPATION can increase the amount of normal intestinal gas.  --> Avoid constipation by increasing the amount of fiber in your diet by including whole cereal grains, fresh vegetables (except those in the above list) and fresh fruits. High-fiber foods absorb water and carry it out of the body. When increasing the amount of fiber in your diet, you also need to increase the amount of water that you drink. You should drink at least eight 8-ounce glasses of water (two quarts) per day.     Normocytic anemia  - chronic anemia dating back to at least 8/2018; Recommend follow-up with Primary Care Provider/hematology for continued evaluation and management.     History of shortness of breath  - follow-up with PCP/pulmonology for continued evaluation and management ASAP  - if experiencing symptoms of headache, chest pain, severe/persistent shortness of breath, dizziness, and/or blurred vision, recommend going to ER for further evaluation and management     Follow up in about 1 month (around 2/5/2023), or if symptoms worsen or fail to improve.              See recent Upper GI endoscopy   Indications:           Melena   Providers:             Lamin Owens MD  Findings:        The esophagus was normal.        One 4 mm angiodysplastic lesion with bleeding was found in the        gastric antrum. Fulguration to stop the bleeding by bipolar probe        was successful.        Two 3 mm angiodysplastic lesions with bleeding were found in the        second  portion of the duodenum. Coagulation for hemostasis using        bipolar probe was successful.   Impression:            - Normal esophagus.                          - One bleeding angiodysplastic lesion in the                          stomach. Treated with bipolar cautery.                          - Two bleeding angiodysplastic lesions in the                          duodenum. Treated with bipolar cautery.                          - No specimens collected.   Recommendation:        - Discharge patient to home.                          - Resume previous diet.                          - Return to my office in 2 weeks.   Lamin Owens MD   6/9/2022       See last Colonoscopy 4/26/2021,  Dr. Mari.  Now, the colonoscope was inserted in the anus   and rectum, again revealed internal and external hemorrhoid.  The colonoscope   was advanced into the sigmoid colon and left colon.  The patient did have a lot   of spasm of the colon, otherwise unremarkable, did have a few scattered   diverticula.  The colonoscope was advanced into the left colon, splenic flexure,   transverse colon, right colon and cecum.  There was no evidence of any bleeding   from cecum all the way to the rectum and the patient at this time did have some   hemorrhoids.  They are not bleeding, but they are large internal hemorrhoids   and gradually colonoscope was removed and mucosa was again inspected with final   diagnoses of spasmodic colon, internal and external hemorrhoid, intraabdominal   adhesions.  At this time, the patient will be watched very closely.  The patient   will require further investigation, as the gastroscopy and colonoscopy is   normal for the bleeding at this time except the large internal hemorrhoids that   could bleed.  The patient will require further CT scan and if the patient   continues to bleed, the patient will require further capsule studies of the   small bowel, which will be done as an outpatient.  At this time, we  will   continue the treatment and watch very closely.         See last CT Scan, 6/8/2022:  Impression:   1. No acute abnormality appreciated within the abdomen and pelvis.  2. Small hiatal hernia.  3. Spleen is borderline in size.  4. Few colonic diverticula noted without evidence of acute diverticulitis.  5. Urinary bladder distension.  6. Prostatomegaly.  7. Additional findings as above.      Electronically signed by: Rafa Burnham MD  Date:                                            06/08/2022       Latest Reference Range & Units 01/05/22 12:27 06/08/22 13:47 06/09/22 05:41 01/06/23 07:57   Hemoglobin 14.0 - 18.0 g/dL 9.4 (L) 7.1 (L) 9.1 (L) 8.6 (L)   Hematocrit 40.0 - 54.0 % 29.4 (L) 21.9 (L) 28.0 (L) 27.0 (L)   MCV 82 - 98 fL 92 93 92 90      Latest Reference Range & Units 04/25/21 08:00 04/25/21 19:48 04/26/21 05:23 04/26/21 08:33 04/26/21 19:54 04/27/21 06:03   Hemoglobin 14.0 - 18.0 g/dL 8.5 (L) 9.6 (L) 8.5 (L) 9.4 (L) 9.1 (L) 8.7 (L)   Hematocrit 40.0 - 54.0 % 26.0 (L) 29.0 (L) 25.6 (L) 28.5 (L) 27.1 (L) 26.1 (L)   MCV 82 - 98 fL 88  89   88         And see cardiac cath report, 1/6/2023:  Coronary Findings  DOminance: Right  Left Main   The vessel was visualized by angiography, is moderate in size and is angiographically normal.      Left Anterior Descending   The vessel was visualized by angiography. Medium-sized vessel with luminal irregularities but no significant lesions      Left Circumflex   The vessel was visualized by angiography. Medium-sized vessel with luminal irregularities but no significant lesions      Right Coronary Artery   The vessel was visualized by angiography. Medium-sized dominant vessel with a 70-80% stenosis in the proximal to mid segment and PERCY 3 flow in the distal vessel      Intervention     No interventions have been documented.     Recommendations     Routine post-cath care.      Will schedule PCI of the RCA via femoral approach once he has completed the GI workup for the GI  bleed he had last week           PTA Medications   Medication Sig    amoxicillin-clavulanate 500-125mg (AUGMENTIN) 500-125 mg Tab Take 1 tablet by mouth 2 (two) times daily as needed (dental appointments).    azelastine (ASTELIN) 137 mcg (0.1 %) nasal spray INHALE 1 SPRAY IN EACH NOSTRIL TWICE A DAY FOR ALLERGIES    azelastine-fluticasone (DYMISTA) 137-50 mcg/spray Spry nassal spray 1 spray by Nasal route daily as needed (congestion).     budesonide-formoterol 160-4.5 mcg (SYMBICORT) 160-4.5 mcg/actuation HFAA Inhale 2 puffs into the lungs every 12 (twelve) hours as needed (shortness of breath). Controller    cetirizine (ZYRTEC) 10 MG tablet 10 mg.    cyanocobalamin, vitamin B-12, 5,000 mcg Subl Take 1 tablet by mouth once daily.     donepeziL (ARICEPT) 5 MG tablet Take 5 mg by mouth every evening.    ferrous gluconate (FERGON) 324 MG tablet Take 324 mg by mouth 2 (two) times a day.    finasteride (PROSCAR) 5 mg tablet Take 5 mg by mouth every evening.    folic acid (FOLVITE) 1 MG tablet Take 1 mg by mouth once daily.     furosemide (LASIX) 20 MG tablet Take 1 tablet (20 mg total) by mouth every other day.    gemfibrozil (LOPID) 600 MG tablet Take 600 mg by mouth 2 (two) times daily.     ipratropium (ATROVENT) 21 mcg (0.03 %) nasal spray INHALE 2 SPRAYS IN EACH NOSTRIL FOUR TIMES A DAY AS NEEDED FOR ALLERGIES TAKE 20 MINUTES BEFORE EATING TO HELP WITH RUNNY NOSE. TAKE AS NEEDED.    magnesium 30 mg Tab Take by mouth once.    melatonin (MELATIN) 3 mg tablet Take 2 tablets (6 mg total) by mouth nightly as needed for Insomnia.    metoprolol succinate (TOPROL-XL) 25 MG 24 hr tablet Take 0.5 tablets (12.5 mg total) by mouth every evening.    multivitamin (THERAGRAN) per tablet Take 1 tablet by mouth once daily.    nateglinide (STARLIX) 120 MG tablet Take 120 mg by mouth 3 (three) times daily before meals.    pantoprazole (PROTONIX) 40 MG tablet Take 40 mg by mouth 2 (two) times daily.    potassium chloride (KLOR-CON) 10  MEQ TbSR Take 1 tablet (10 mEq total) by mouth every other day.    predniSONE (DELTASONE) 10 MG tablet Take 10 mg by mouth 2 (two) times daily.    propylene glycol/peg 400/PF (SYSTANE, PF, OPHT) Place 1 drop into both eyes 2 (two) times a day.    SITagliptin (JANUVIA) 50 MG Tab Take 50 mg by mouth once daily.    tamsulosin (FLOMAX) 0.4 mg Cap Take 1 capsule by mouth with lunch.    vitamin D (VITAMIN D3) 1000 units Tab Take 1,000 Units by mouth once daily.    camphor-menthol 0.5-0.5% (SARNA) lotion APPLY MODERATE AMOUNT TOPICALLY TWICE A DAY    clotrimazole (LOTRIMIN) 1 % Soln APPLY SMALL AMOUNT TOPICALLY ONCE DAILY FOR FUNGAL INFECTION APPLY TO TOENAILS    FREESTYLE SAVANNA 2 SENSOR Kit     guaiFENesin (MUCINEX) 600 mg 12 hr tablet Take 1,200 mg by mouth 2 (two) times daily as needed for Congestion.    HYDROPHILIC CREAM TOP APPLY MODERATE AMOUNT TOPICALLY TWICE A DAY AS NEEDED FOR DRY SKIN    insulin glargine,hum.rec.anlog (BASAGLAR KWIKPEN U-100 INSULIN SUBQ) Inject 15 Units into the skin every evening.    insulin lispro 100 unit/mL pen Inject 18-19 Units into the skin before meals as needed (HIGH BLOOD SUGAR VIA SLIDING SCALE). **PATIENT DOESN'T KNOW SLIDING SCALE DETAILS COMPLETELY**    memantine (NAMENDA) 10 MG Tab 1 tablet.    ONETOUCH VERIO TEST STRIPS Strp SMARTSIG:Via Meter    peg 400-propylene glycol 0.4-0.3 % Drop INSTILL ONE DROP IN EACH EYE FOUR TIMES A DAY AS NEEDED FOR DRY EYES    polyethylene glycol (GLYCOLAX) 17 gram/dose powder Take 17 g by mouth daily as needed.    psyllium 0.52 gram capsule Take 0.52 g by mouth daily as needed.    simethicone (MYLICON) 125 MG chewable tablet Take 125 mg by mouth every 6 (six) hours as needed for Flatulence.    triamcinolone acetonide 0.1% (KENALOG) 0.1 % ointment APPLY MODERATE AMOUNT TOPICALLY TWICE A DAY FOR INFLAMMATION  USE FOR MOST SEVERE ITCHY AREA FOR 1-2 WEEKS, DO NOT USE ON FACE, AXILLA  AND GROIN FOR INFLAMMATION  USE FOR MOST SEVERE ITCHY AREA FOR 1-2  WEEKS, DO NOT USE ON FACE, AXILLA   AND GROIN       Review of patient's allergies indicates:   Allergen Reactions    Meclizine hcl Other (See Comments)    Meloxicam     Methocarbamol Hallucinations    Montelukast     Morphine Hallucinations    Rosuvastatin Other (See Comments)    Tramadol Hallucinations    Xyzal [levocetirizine]     Aleve [naproxen sodium] Nausea Only    Amoxicillin Nausea And Vomiting    Rivastigmine Itching and Rash        Past Medical History:   Diagnosis Date    Arthritis     Dementia     Diabetes mellitus     CHOWDHURY (dyspnea on exertion)     Essential hypertension 05/03/2020    HEARING LOSS     Leukemia     Rash      Past Surgical History:   Procedure Laterality Date    A-V CARDIAC PACEMAKER INSERTION Left 12/31/2021    Procedure: INSERTION, CARDIAC PACEMAKER, DUAL CHAMBER;  Surgeon: Pastor Castellanos MD;  Location: [facility] CATH;  Service: Cardiology;  Laterality: Left;    ANGIOGRAM, CORONARY, WITH LEFT HEART CATHETERIZATION N/A 1/6/2023    Procedure: Angiogram, Coronary, with Left Heart Cath;  Surgeon: Pastor Castellanos MD;  Location: [facility] CATH;  Service: Cardiology;  Laterality: N/A;    CATARACT EXTRACTION      CHOLECYSTECTOMY      COLONOSCOPY N/A 04/26/2021    Procedure: COLONOSCOPY;  Surgeon: Isma Mari MD;  Location: [facility];  Service: General;  Laterality: N/A; colon polyps per patient report    CYST REMOVAL      CYSTOSCOPY N/A 05/04/2020    Procedure: CYSTOSCOPY;  Surgeon: Slim Mari MD;  Location: [facility] OR;  Service: Urology;  Laterality: N/A;    ESOPHAGOGASTRODUODENOSCOPY N/A 04/25/2021    Procedure: EGD (ESOPHAGOGASTRODUODENOSCOPY);  Surgeon: Isma Mari MD;  Location: [facility];  Service: General;  Laterality: N/A;    ESOPHAGOGASTRODUODENOSCOPY N/A 06/09/2022    Procedure: EGD (ESOPHAGOGASTRODUODENOSCOPY);  Surgeon: Lamin Owens MD;  Location: [facility];  Service: Endoscopy;  Laterality: N/A;    HERNIA REPAIR      X2    JOINT REPLACEMENT Right   "   shoulder    JOINT REPLACEMENT Right     knee    JOINT REPLACEMENT Left     knee    LITHOTRIPSY      PROSTATE SURGERY      REMOVAL OF BLOOD CLOT N/A 05/04/2020    Procedure: REMOVAL, BLOOD CLOT;  Surgeon: Slim Mari MD;  Location: [facility] OR;  Service: Urology;  Laterality: N/A;    TONSILLECTOMY      TRANSURETHRAL RESECTION OF PROSTATE N/A 05/04/2020    Procedure: TURP (TRANSURETHRAL RESECTION OF PROSTATE);  Surgeon: Slim Mari MD;  Location: [facility] OR;  Service: Urology;  Laterality: N/A;     Family History   Problem Relation Age of Onset    Cancer Sister         lung, brain    Cancer Brother     Diabetes Maternal Aunt     Cancer Other         ovarian    Colon cancer Neg Hx     Crohn's disease Neg Hx     Esophageal cancer Neg Hx     Ulcerative colitis Neg Hx     Stomach cancer Neg Hx      Social History     Tobacco Use    Smoking status: Former     Types: Pipe    Smokeless tobacco: Never   Substance Use Topics    Alcohol use: Yes     Comment: only for special occasions    Drug use: Never         OBJECTIVE:     Vital Signs (Most Recent)  Temp: 98.2 °F (36.8 °C) (01/12/23 0752)  Pulse: 63 (01/12/23 0752)  Resp: 14 (01/12/23 0752)  BP: (!) 140/63 (01/12/23 0752)  SpO2: 96 % (01/12/23 0752)    Physical Exam:  : Ht: 5' 6" (167.6 cm)   Wt: 61.2 kg (135 lb)   BMI: 21.79 kg/m²    .                                                   GENERAL:  Comfortable, in no acute distress.                                 HEENT EXAM:  Nonicteric.  No adenopathy.  Oropharynx is clear.               NECK:  Supple.                                                               LUNGS:  Clear.                                                               CARDIAC:  Regular rate and rhythm.  S1, S2.  No murmur.                      ABDOMEN:  Soft, positive bowel sounds, nontender.  No hepatosplenomegaly or masses.  No rebound or guarding.                                             EXTREMITIES:  No edema.     MENTAL STATUS:  Alert and " oriented.    ASSESSMENT/PLAN:     Assessment: Anemia.  GI Bleed.  (Hematochezia.  Melena.)    Plan: Gastroscopy and Colonoscopy    Anesthesia Plan:   MAC / General Anaesthesia    ASA Grade: ASA 2 - Patient with mild systemic disease with no functional limitations    MALLAMPATI SCORE: I (soft palate, uvula, fauces, and tonsillar pillars visible)

## 2023-01-12 NOTE — [employer]OVATION PATIENT INSTRUCTIONS
Discharge Summary/Instructions after an Endoscopic Procedure  Patient Name: Shan Warner  Patient MRN: [id number]  Patient YOB: 1932 Thursday, January 12, 2023  Tyree Naranjo MD  Dear patient,  As a result of recent federal legislation (The Federal Cures Act), you may   receive lab or pathology results from your procedure in your [facility]   account before your physician is able to contact you. Your physician or   their representative will relay the results to you with their   recommendations at their soonest availability.  Thank you,  RESTRICTIONS:  During your procedure today, you received medications for sedation.  These   medications may affect your judgment, balance and coordination.  Therefore,   for 24 hours, you have the following restrictions:   - DO NOT drive a car, operate machinery, make legal/financial decisions,   sign important papers or drink alcohol.    ACTIVITY:  Today: no heavy lifting, straining or running due to procedural   sedation/anesthesia.  The following day: return to full activity including work.  DIET:  Eat and drink normally unless instructed otherwise.     TREATMENT FOR COMMON SIDE EFFECTS:  - Mild abdominal pain, nausea, belching, bloating or excessive gas:  rest,   eat lightly and use a heating pad.  - Sore Throat: treat with throat lozenges and/or gargle with warm salt   water.  - Because air was used during the procedure, expelling large amounts of air   from your rectum or belching is normal.  - If a bowel prep was taken, you may not have a bowel movement for 1-3 days.    This is normal.  SYMPTOMS TO WATCH FOR AND REPORT TO YOUR PHYSICIAN:  1. Abdominal pain or bloating, other than gas cramps.  2. Chest pain.  3. Back pain.  4. Signs of infection such as: chills or fever occurring within 24 hours   after the procedure.  5. Rectal bleeding, which would show as bright red, maroon, or black stools.   (A tablespoon of blood from the rectum is not serious,  especially if   hemorrhoids are present.)  6. Vomiting.  7. Weakness or dizziness.  GO DIRECTLY TO THE NEAREST EMERGENCY ROOM IF YOU HAVE ANY OF THE FOLLOWING:      Difficulty breathing              Chills and/or fever over 101 F   Persistent vomiting and/or vomiting blood   Severe abdominal pain   Severe chest pain   Black, tarry stools   Bleeding- more than one tablespoon   Any other symptom or condition that you feel may need urgent attention  Your doctor recommends these additional instructions:  If any biopsies were taken, your doctors clinic will contact you in 1 to 2   weeks with any results.  Continue your present medications.   Take Protonix (pantoprazole) 40 mg by mouth once a day.   Follow up with Dr. Cooper.  For questions, problems or results please call your physician - Tyree Naranjo MD at Work:  [phone].  EMERGENCY PHONE NUMBER: [phone], LAB RESULTS: [phone]  IF A COMPLICATION OR EMERGENCY SITUATION ARISES AND YOU ARE UNABLE TO REACH   YOUR PHYSICIAN - GO DIRECTLY TO THE EMERGENCY ROOM.  ___________________________________________  Nurse Signature  ___________________________________________  Patient/Designated Responsible Party Signature  Tyree Naranjo MD  1/12/2023 9:33:15 AM  This report has been verified and signed electronically.  Dear patient,  As a result of recent federal legislation (The Federal Cures Act), you may   receive lab or pathology results from your procedure in your [facility]   account before your physician is able to contact you. Your physician or   their representative will relay the results to you with their   recommendations at their soonest availability.  Thank you.  PROVATION

## 2023-01-12 NOTE — TRANSFER OF CARE
"Anesthesia Transfer of Care Note    Patient: Shan Warner    Procedure(s) Performed: Procedure(s) (LRB):  EGD (ESOPHAGOGASTRODUODENOSCOPY) (N/A)  COLONOSCOPY (N/A)    Patient location: PACU    Anesthesia Type: general    Transport from OR: Transported from OR on 2-3 L/min O2 by NC with adequate spontaneous ventilation    Post pain: adequate analgesia    Post assessment: no apparent anesthetic complications and tolerated procedure well    Post vital signs: stable    Level of consciousness: responds to stimulation    Nausea/Vomiting: no nausea/vomiting    Complications: none    Transfer of care protocol was followed      Last vitals:   Visit Vitals  BP (!) 140/63   Pulse 63   Temp 36.8 °C (98.2 °F)   Resp 14   Ht 5' 6" (1.676 m)   Wt 61.2 kg (135 lb)   SpO2 96%   BMI 21.79 kg/m²     "

## 2023-01-13 ENCOUNTER — TELEPHONE (OUTPATIENT)
Dept: GASTROENTEROLOGY | Facility: CLINIC | Age: 88
End: 2023-01-13
Payer: OTHER GOVERNMENT

## 2023-01-13 VITALS
OXYGEN SATURATION: 100 % | HEART RATE: 62 BPM | RESPIRATION RATE: 15 BRPM | TEMPERATURE: 98 F | WEIGHT: 135 LBS | BODY MASS INDEX: 21.69 KG/M2 | SYSTOLIC BLOOD PRESSURE: 137 MMHG | DIASTOLIC BLOOD PRESSURE: 58 MMHG | HEIGHT: 66 IN

## 2023-01-13 DIAGNOSIS — R19.8 ABNORMAL FINDINGS ON ESOPHAGOGASTRODUODENOSCOPY (EGD): ICD-10-CM

## 2023-01-13 DIAGNOSIS — I35.0 AORTIC VALVE STENOSIS, ETIOLOGY OF CARDIAC VALVE DISEASE UNSPECIFIED: Primary | ICD-10-CM

## 2023-01-13 DIAGNOSIS — R06.02 SOB (SHORTNESS OF BREATH): ICD-10-CM

## 2023-01-13 RX ORDER — SODIUM CHLORIDE 0.9 % (FLUSH) 0.9 %
10 SYRINGE (ML) INJECTION
Status: DISCONTINUED | OUTPATIENT
Start: 2023-01-13 | End: 2023-02-08 | Stop reason: CLARIF

## 2023-01-13 RX ORDER — SODIUM CHLORIDE 9 MG/ML
INJECTION, SOLUTION INTRAVENOUS ONCE
Status: CANCELLED | OUTPATIENT
Start: 2023-01-13 | End: 2023-01-13

## 2023-01-13 NOTE — TELEPHONE ENCOUNTER
Returned call to the patient's wife she states that the patient went to the bathroom and blood was coming from his rectum like you turned on a water facet, she was advise dto take him to [facility], Dr. Naranjo was called at [facility] and made aware of this.

## 2023-01-13 NOTE — TELEPHONE ENCOUNTER
----- Message from Mary Espinoza sent at 1/13/2023  9:56 AM CST -----  Contact: Patient  Type: Patient Call Back         Who called:Wife          What is the request in detail:  I have Mrs. Warner calling back requesting to give an update from patient's post EGD done yesterday; states there is some new information she would like to share; please advise         Best call back number: [phone]         Additional Information:            Thank You

## 2023-01-13 NOTE — TELEPHONE ENCOUNTER
----- Message from oS Singh, Patient Care Assistant sent at 1/13/2023  2:45 PM CST -----  Regarding: returning call  Contact: pt's wife  Type:  Patient Returning Call    Who Called:  pt's wife  Who Left Message for Patient:  calos   Does the patient know what this is regarding?:  yes - currently at ED   Best Call Back Number:  [phone] (home)     Additional Information: please call pt's wife to advise. Thanks!

## 2023-01-13 NOTE — TELEPHONE ENCOUNTER
Returned patient call, patient in emergency room and wanted to give an update as they have made it to the ER and he getting tests done.

## 2023-01-16 PROBLEM — K92.2 ACUTE LOWER GI BLEEDING: Status: RESOLVED | Noted: 2023-01-16 | Resolved: 2023-01-16

## 2023-01-16 PROBLEM — K92.2 ACUTE LOWER GI BLEEDING: Status: ACTIVE | Noted: 2023-01-16

## 2023-01-18 ENCOUNTER — PATIENT MESSAGE (OUTPATIENT)
Dept: CARDIOLOGY | Facility: CLINIC | Age: 88
End: 2023-01-18
Payer: OTHER GOVERNMENT

## 2023-01-18 DIAGNOSIS — R93.89 ABNORMAL ANGIOGRAM: Primary | ICD-10-CM

## 2023-01-18 DIAGNOSIS — I35.0 AORTIC VALVE STENOSIS, ETIOLOGY OF CARDIAC VALVE DISEASE UNSPECIFIED: ICD-10-CM

## 2023-01-18 DIAGNOSIS — R06.02 SOB (SHORTNESS OF BREATH): ICD-10-CM

## 2023-01-18 LAB
FINAL PATHOLOGIC DIAGNOSIS: NORMAL
GROSS: NORMAL
Lab: NORMAL

## 2023-01-18 RX ORDER — SODIUM CHLORIDE 0.9 % (FLUSH) 0.9 %
10 SYRINGE (ML) INJECTION
Status: DISCONTINUED | OUTPATIENT
Start: 2023-01-18 | End: 2023-02-08 | Stop reason: CLARIF

## 2023-01-18 RX ORDER — SODIUM CHLORIDE 9 MG/ML
INJECTION, SOLUTION INTRAVENOUS ONCE
Status: CANCELLED | OUTPATIENT
Start: 2023-01-18 | End: 2023-01-18

## 2023-01-18 NOTE — ANESTHESIA POSTPROCEDURE EVALUATION
Anesthesia Post Evaluation    Patient: Shan Warner    Procedure(s) Performed: Procedure(s) (LRB):  EGD (ESOPHAGOGASTRODUODENOSCOPY) (N/A)  COLONOSCOPY (N/A)    Final Anesthesia Type: general      Patient location during evaluation: PACU  Patient participation: Yes- Able to Participate  Level of consciousness: awake and alert  Post-procedure vital signs: reviewed and stable  Pain management: adequate  Airway patency: patent    PONV status at discharge: No PONV  Anesthetic complications: no      Cardiovascular status: blood pressure returned to baseline  Respiratory status: unassisted and room air  Hydration status: euvolemic  Follow-up not needed.          Vitals Value Taken Time   /60 01/16/23 0852   Temp 36.7 °C (98 °F) 01/16/23 0852   Pulse 64 01/16/23 0852   Resp 18 01/16/23 0852   SpO2 98 % 01/16/23 0852         Event Time   Out of Recovery 09:50:00         Pain/Ugo Score: No data recorded

## 2023-01-20 ENCOUNTER — OFFICE VISIT (OUTPATIENT)
Dept: CARDIOLOGY | Facility: CLINIC | Age: 88
End: 2023-01-20
Payer: OTHER GOVERNMENT

## 2023-01-20 VITALS
HEIGHT: 66 IN | BODY MASS INDEX: 20.73 KG/M2 | HEART RATE: 75 BPM | DIASTOLIC BLOOD PRESSURE: 56 MMHG | SYSTOLIC BLOOD PRESSURE: 111 MMHG | WEIGHT: 129 LBS

## 2023-01-20 DIAGNOSIS — I44.2 AV BLOCK, COMPLETE: ICD-10-CM

## 2023-01-20 DIAGNOSIS — K25.4 GASTROINTESTINAL HEMORRHAGE ASSOCIATED WITH GASTRIC ULCER: ICD-10-CM

## 2023-01-20 DIAGNOSIS — I10 ESSENTIAL HYPERTENSION: ICD-10-CM

## 2023-01-20 DIAGNOSIS — I25.10 CORONARY ARTERY DISEASE INVOLVING NATIVE CORONARY ARTERY OF NATIVE HEART WITHOUT ANGINA PECTORIS: ICD-10-CM

## 2023-01-20 DIAGNOSIS — I35.0 AORTIC VALVE STENOSIS, ETIOLOGY OF CARDIAC VALVE DISEASE UNSPECIFIED: ICD-10-CM

## 2023-01-20 PROCEDURE — 99999 PR PBB SHADOW E&M-EST. PATIENT-LVL V: CPT | Mod: PBBFAC,,,

## 2023-01-20 PROCEDURE — 99215 OFFICE O/P EST HI 40 MIN: CPT | Mod: PBBFAC,PO

## 2023-01-20 PROCEDURE — 99214 OFFICE O/P EST MOD 30 MIN: CPT | Mod: S$PBB,,,

## 2023-01-20 PROCEDURE — 99214 PR OFFICE/OUTPT VISIT, EST, LEVL IV, 30-39 MIN: ICD-10-PCS | Mod: S$PBB,,,

## 2023-01-20 PROCEDURE — 99999 PR PBB SHADOW E&M-EST. PATIENT-LVL V: ICD-10-PCS | Mod: PBBFAC,,,

## 2023-01-20 NOTE — ASSESSMENT & PLAN NOTE
/56 today   Continue toprol and lasix at present dosing   Would avoid any further BP control in future given AS

## 2023-01-20 NOTE — PROGRESS NOTES
Subjective:    Patient ID:  Shan Warner is a 89 y.o. male patient here for evaluation Hospital Follow Up      History of Present Illness:     Mr. Torrez is a 89 year old M who follows with Dr. Castellanos here today for a hospital follow up. He was admitted with NSTEMI and GI bleed. He has been set up for staged PCI of the RCA on Feb 9, 2023. He had three ulcers and 2 polyps treated with two different colonoscopies. EGD negative.   He does have some CHOWDHURY= NHYA class II symptoms.   No chest pain, dizziness, syncope, falls, palpitations, swelling in legs.         Review of patient's allergies indicates:   Allergen Reactions    Meclizine hcl Other (See Comments)    Meloxicam     Methocarbamol Hallucinations    Montelukast     Morphine Hallucinations    Rosuvastatin Other (See Comments)    Tramadol Hallucinations    Xyzal [levocetirizine]     Aleve [naproxen sodium] Nausea Only    Rivastigmine Itching and Rash       Past Medical History:   Diagnosis Date    Arthritis     Dementia     Diabetes mellitus     CHOWDHURY (dyspnea on exertion)     Essential hypertension 05/03/2020    HEARING LOSS     Leukemia     Rash      Past Surgical History:   Procedure Laterality Date    A-V CARDIAC PACEMAKER INSERTION Left 12/31/2021    Procedure: INSERTION, CARDIAC PACEMAKER, DUAL CHAMBER;  Surgeon: Pastor Castellanos MD;  Location: [facility];  Service: Cardiology;  Laterality: Left;    ANGIOGRAM, CORONARY, WITH LEFT HEART CATHETERIZATION N/A 1/6/2023    Procedure: Angiogram, Coronary, with Left Heart Cath;  Surgeon: Pastor Castellanos MD;  Location: [facility];  Service: Cardiology;  Laterality: N/A;    CATARACT EXTRACTION      CHOLECYSTECTOMY      COLONOSCOPY N/A 04/26/2021    Procedure: COLONOSCOPY;  Surgeon: Isma Mari MD;  Location: [facility];  Service: General;  Laterality: N/A; colon polyps per patient report    COLONOSCOPY N/A 1/14/2023    Procedure: COLONOSCOPY;  Surgeon: Bonilla Rodriguez MD;  Location: [facility];  Service: Endoscopy;  Laterality: N/A;    COLONOSCOPY N/A 1/12/2023    Procedure: COLONOSCOPY;  Surgeon: Tyree Naranjo Jr., MD;  Location: [facility];  Service: Endoscopy;  Laterality: N/A;    CYST REMOVAL      CYSTOSCOPY N/A 05/04/2020    Procedure: CYSTOSCOPY;  Surgeon: Slim Mari MD;  Location: [facility];  Service: Urology;  Laterality: N/A;    ESOPHAGOGASTRODUODENOSCOPY N/A 04/25/2021    Procedure: EGD (ESOPHAGOGASTRODUODENOSCOPY);  Surgeon: Isma Mari MD;  Location: [facility];  Service: General;  Laterality: N/A;    ESOPHAGOGASTRODUODENOSCOPY N/A 06/09/2022    Procedure: EGD (ESOPHAGOGASTRODUODENOSCOPY);  Surgeon: Lamin Owens MD;  Location: [facility];  Service: Endoscopy;  Laterality: N/A;    ESOPHAGOGASTRODUODENOSCOPY N/A 1/12/2023    Procedure: EGD (ESOPHAGOGASTRODUODENOSCOPY);  Surgeon: Tyree Naranjo Jr., MD;  Location: [facility];  Service: Endoscopy;  Laterality: N/A;    HERNIA REPAIR      X2    JOINT REPLACEMENT Right     shoulder    JOINT REPLACEMENT Right     knee    JOINT REPLACEMENT Left     knee    LITHOTRIPSY      PROSTATE SURGERY      REMOVAL OF BLOOD CLOT N/A 05/04/2020    Procedure: REMOVAL, BLOOD CLOT;  Surgeon: Slim Mari MD;  Location: [facility];  Service: Urology;  Laterality: N/A;    TONSILLECTOMY      TRANSURETHRAL RESECTION OF PROSTATE N/A 05/04/2020    Procedure: TURP (TRANSURETHRAL RESECTION OF PROSTATE);  Surgeon: Slim Mari MD;  Location: [facility];  Service: Urology;  Laterality: N/A;     Social History     Tobacco Use    Smoking status: Former     Types: Pipe    Smokeless tobacco: Never   Substance Use Topics    Alcohol use: Yes     Comment: only for special occasions    Drug use: Never        REVIEW OF SYSTEMS: As noted in HPI   CARDIOVASCULAR: No recent chest pain, palpitations, arm, neck, or jaw pain  RESPIRATORY: No recent fever, cough chills, SOB or congestion  : No blood in the urine  GI: No Nausea, vomiting, constipation, diarrhea,  blood, or reflux.  MUSCULOSKELETAL: No myalgias  NEURO: No lightheadedness or dizziness  EYES: No Double vision, blurry, vision or headache        Objective        Vitals:    01/20/23 1058   BP: (!) 111/56   Pulse: 75       LIPIDS - LAST 2   Lab Results   Component Value Date    CHOL 113 (L) 09/03/2020    CHOL 121 07/17/2020    HDL 25 (L) 09/03/2020    HDL 26 (L) 07/17/2020    LDLCALC 62.4 (L) 09/03/2020    LDLCALC 73.6 07/17/2020    TRIG 128 09/03/2020    TRIG 107 07/17/2020    CHOLHDL 22.1 09/03/2020    CHOLHDL 21.5 07/17/2020       CBC - LAST 2  Lab Results   Component Value Date    WBC 13.93 (H) 01/15/2023    WBC 21.42 (H) 01/13/2023    RBC 2.83 (L) 01/15/2023    RBC 3.14 (L) 01/13/2023    HGB 8.4 (L) 01/15/2023    HGB 9.1 (L) 01/13/2023    HCT 24.7 (L) 01/15/2023    HCT 28.1 (L) 01/13/2023    MCV 87 01/15/2023    MCV 90 01/13/2023    MCH 29.7 01/15/2023    MCH 29.0 01/13/2023    MCHC 34.0 01/15/2023    MCHC 32.4 01/13/2023    RDW 16.7 (H) 01/15/2023    RDW 17.0 (H) 01/13/2023    PLT 92 (L) 01/15/2023     (L) 01/13/2023    MPV 10.8 01/15/2023    MPV 10.7 01/13/2023    GRAN 12.1 (H) 01/15/2023    GRAN 86.0 (H) 01/15/2023    LYMPH 3.0 (L) 01/15/2023    LYMPH 4.0 (L) 01/13/2023    MONO 8.0 01/15/2023    MONO 20.0 (H) 01/13/2023    BASO CANCELED 09/03/2020    BASO CANCELED 07/17/2020    NRBC 0 01/15/2023    NRBC 0 01/13/2023       CHEMISTRY & LIVER FUNCTION - LAST 2  Lab Results   Component Value Date     01/15/2023     01/13/2023    K 3.9 01/15/2023    K 4.1 01/13/2023     01/15/2023     01/13/2023    CO2 26 01/15/2023    CO2 25 01/13/2023    ANIONGAP 5 (L) 01/15/2023    ANIONGAP 9 01/13/2023    BUN 13 01/15/2023    BUN 20 01/13/2023    CREATININE 0.96 01/15/2023    CREATININE 1.23 01/13/2023     (H) 01/15/2023    GLU 71 01/13/2023    CALCIUM 8.7 01/15/2023    CALCIUM 9.4 01/13/2023    MG 1.5 (L) 04/27/2021    ALBUMIN 3.8 01/15/2023    ALBUMIN 4.4 01/13/2023    PROT 6.7  01/15/2023    PROT 7.6 01/13/2023    ALKPHOS 84 01/15/2023    ALKPHOS 86 01/13/2023    ALT 15 01/15/2023    ALT 17 01/13/2023    AST 27 01/15/2023    AST 29 01/13/2023    BILITOT 0.8 01/15/2023    BILITOT 0.8 01/13/2023        CARDIAC PROFILE - LAST 2  Lab Results   Component Value Date    BNP 88 01/05/2022    TROPONINI <0.012 12/31/2021        COAGULATION - LAST 2  Lab Results   Component Value Date    LABPT 15.3 (H) 06/08/2022    LABPT 16.2 (H) 04/24/2021    INR 1.2 06/08/2022    INR 1.3 04/24/2021       ENDOCRINE & PSA - LAST 2  Lab Results   Component Value Date    HGBA1C 5.4 06/09/2022    HGBA1C 6.0 (H) 01/05/2022    TSH 3.133 09/03/2020    TSH 3.025 07/17/2020    PSA 1.7 09/03/2020        ECHOCARDIOGRAM RESULTS  Results for orders placed in visit on 12/09/22    Echo    Interpretation Summary  · The left ventricle is normal in size with concentric remodeling and normal systolic function.  · The estimated ejection fraction is 55%.  · Grade I left ventricular diastolic dysfunction.  · Normal right ventricular size with normal right ventricular systolic function.  · Moderate left atrial enlargement.  · There is moderate-to-severe aortic valve stenosis.  · Aortic valve area is 0.84 cm2; peak velocity is 3.6 m/s; mean gradient is 29 mmHg.  · Mild mitral regurgitation.  · Normal central venous pressure (3 mmHg).  · The estimated PA systolic pressure is 34 mmHg.      CURRENT/PREVIOUS VISIT EKG  Results for orders placed or performed during the hospital encounter of 01/06/23   EKG 12-lead    Collection Time: 01/06/23  8:24 AM    Narrative    Test Reason : I25.10,    Vent. Rate : 060 BPM     Atrial Rate : 060 BPM     P-R Int : 174 ms          QRS Dur : 174 ms      QT Int : 516 ms       P-R-T Axes : -02 -67 101 degrees     QTc Int : 516 ms    AV dual-paced rhythm  Abnormal ECG  When compared with ECG of 04-MAY-2022 14:32,  Previous ECG has undetermined rhythm, needs review  Confirmed by Isaiah Villagomez MD ([id number]) on  1/6/2023 12:04:55 PM    Referred By: KEIRA ONEILL MD           Confirmed By:Isaiah Villagomez MD     No valid procedures specified.   Results for orders placed during the hospital encounter of 05/04/22    Nuclear Stress - Cardiology Interpreted    Interpretation Summary    Abnormal myocardial perfusion scan.    There is a mild intensity, small sized, reversible perfusion abnormality that is consistent with ischemia in the mid to distal inferoseptal wall(s).    There are no other significant perfusion abnormalities.    The gated perfusion images showed an ejection fraction of 63% at rest.    There is normal wall motion at rest.    The EKG portion of this study is uninterpretable for ischemia secondary to paced rhythm.    There are no prior studies for comparison.        PHYSICAL EXAM  CONSTITUTIONAL: Well built, well nourished in no apparent distress  NECK: no carotid bruit, no JVD  LUNGS: CTA  CHEST WALL: no tenderness  HEART: late peaking systolic murmur radiated to the carotids   ABDOMEN: soft, non-tender; bowel sounds normal; no masses,  no organomegaly  EXTREMITIES: Extremities normal, no edema, no calf tenderness noted  NEURO: AAO X 3    I HAVE REVIEWED :    The vital signs, nurses notes, and all the pertinent radiology and labs.    Current Outpatient Medications   Medication Instructions    acetaminophen (TYLENOL) 325 mg, Oral, Every 8 hours PRN    amoxicillin-clavulanate 500-125mg (AUGMENTIN) 500-125 mg Tab 1 tablet, Oral, 2 times daily PRN    azelastine (ASTELIN) 137 mcg (0.1 %) nasal spray 1 spray, Nasal, 2 times daily    azelastine-fluticasone (DYMISTA) 137-50 mcg/spray Spry nassal spray 1 spray, Nasal, Daily PRN    budesonide-formoterol 160-4.5 mcg (SYMBICORT) 160-4.5 mcg/actuation HFAA 2 puffs, Inhalation, Every 12 hours PRN, Controller     camphor-menthol 0.5-0.5% (SARNA) lotion 1 each, Topical (Top), As needed (PRN)    cetirizine (ZYRTEC) 10 mg    clotrimazole (LOTRIMIN) 1 % Soln 1 application,  Topical (Top), Daily PRN    cyanocobalamin, vitamin B-12, 5,000 mcg Subl 1 tablet, Oral, Daily    donepeziL (ARICEPT) 5 mg, Oral, Nightly    ferrous gluconate (FERGON) 324 mg, Oral, 2 times daily    finasteride (PROSCAR) 5 mg, Oral, Nightly    folic acid (FOLVITE) 1 mg, Oral, Daily    FREESTYLE SAVANNA 2 SENSOR Kit No dose, route, or frequency recorded.    furosemide (LASIX) 20 mg, Oral, Every other day    gemfibroziL (LOPID) 600 mg, 2 times daily    guaiFENesin (MUCINEX) 1,200 mg, Oral, 2 times daily PRN    HYDROPHILIC CREAM TOP 1 application, Topical (Top), 2 times daily PRN    insulin glargine,hum.rec.anlog (BASAGLAR KWIKPEN U-100 INSULIN SUBQ) 14-17 Units, Subcutaneous, Nightly    insulin lispro 16-17 Units, Subcutaneous, 3 times daily before meals PRN, humalog    ipratropium (ATROVENT) 21 mcg (0.03 %) nasal spray 2 sprays, Each Nostril, 3 times daily PRN    magnesium 30 mg, Oral, Daily    melatonin (MELATIN) 6 mg, Oral, Nightly PRN    memantine (NAMENDA) 10 MG Tab 1 tablet    metoprolol succinate (TOPROL-XL) 25 mg, Oral, Nightly    multivitamin (THERAGRAN) per tablet 1 tablet, Oral, Nightly    nateglinide (STARLIX) 120 mg, Oral, 3 times daily before meals    ONETOUCH VERIO TEST STRIPS Strp SMARTSIG:Via Meter    pantoprazole (PROTONIX) 40 mg, Oral, 2 times daily    peg 400-propylene glycol 0.4-0.3 % Drop INSTILL ONE DROP IN EACH EYE FOUR TIMES A DAY AS NEEDED FOR DRY EYES    petrolat,wht/min oil/sod chl (DRY EYES OPHT) 1 drop, Both Eyes, Daily    polyethylene glycol (GLYCOLAX) 17 g, Oral, Daily PRN    potassium chloride (KLOR-CON) 10 MEQ TbSR 10 mEq, Oral, Every other day    predniSONE (DELTASONE) 10 mg, Oral, 2 times daily    propylene glycol/peg 400/PF (SYSTANE, PF, OPHT) 1 drop, Both Eyes, 2 times daily    psyllium 0.52 g, Oral, Daily PRN    simethicone (MYLICON) 125 mg, Oral, Every 6 hours PRN    SITagliptin phosphate (JANUVIA) 50 mg, Oral, Daily    tamsulosin (FLOMAX) 0.4 mg, Oral, with lunch    triamcinolone  acetonide 0.1% (KENALOG) 0.1 % ointment APPLY MODERATE AMOUNT TOPICALLY TWICE A DAY FOR INFLAMMATION  USE FOR MOST SEVERE ITCHY AREA FOR 1-2 WEEKS, DO NOT USE ON FACE, AXILLA  AND GROIN FOR INFLAMMATION  USE FOR MOST SEVERE ITCHY AREA FOR 1-2 WEEKS, DO NOT USE ON FACE, AXILLA   AND GROIN    vitamin D (VITAMIN D3) 1,000 Units, Oral, Daily        Assessment & Plan     Coronary artery disease involving native coronary artery  Scheduled for PCI to the RCA with Dr. Castellanos on Feb 9th.       Aortic stenosis  CHOWDHURY NHYA class II-III symptoms   LIAN 0.84, peak velocity 3.6, MG 29  Possible DSE after PCI to assess for low flow low gradient severe AS   Will arrange appointment with Dr. Quinonez for TAVR eval     AV block, complete  S/p PPM in situ   Normal device function 11/2022     Essential hypertension  /56 today   Continue toprol and lasix at present dosing   Would avoid any further BP control in future given AS       GI bleed  Had 2 polyps removed and then repeat colonoscopy for continued bleeding where 3 ulcers were cauterized and 6 clips were placed           Follow up in about 4 weeks (around 2/17/2023). After PCI to RCA.

## 2023-01-20 NOTE — ASSESSMENT & PLAN NOTE
Had 2 polyps removed and then repeat colonoscopy for continued bleeding where 3 ulcers were cauterized and 6 clips were placed

## 2023-01-20 NOTE — ASSESSMENT & PLAN NOTE
CHOWDHURY NHYA class II-III symptoms   LIAN 0.84, peak velocity 3.6, MG 29  Possible DSE after PCI to assess for low flow low gradient severe AS   Will arrange appointment with Dr. Quinonez for TAVR eval

## 2023-01-26 ENCOUNTER — TELEPHONE (OUTPATIENT)
Dept: GASTROENTEROLOGY | Facility: CLINIC | Age: 88
End: 2023-01-26
Payer: OTHER GOVERNMENT

## 2023-01-26 NOTE — TELEPHONE ENCOUNTER
Returned call to patient's wife Celia, She states that he has developed rectal bleeding post procedure this weekend, She was advised to take him to the Er and she states that they are already in the parking lot of [facility].

## 2023-01-26 NOTE — TELEPHONE ENCOUNTER
----- Message from Minerva Newby LPN sent at 1/26/2023 11:15 AM CST -----  He is Dr. Naranjo's pt, Dr. SUKI adams over the weekend on call  ----- Message -----  From: Rafa Schmitz  Sent: 1/26/2023  10:16 AM CST  To: Jennifer BENJAMIN Staff    Type:  Same Day Appointment Request    Caller is requesting a same day appointment.  Caller declined first available appointment listed below.      Name of Caller:  Wife/ Franklin Warner   When is the first available appointment?    Symptoms:  Heavy bleeding-Post colonoscopy  Best Call Back Number:  [phone]  Additional Information:   Caller is also contacting PCP

## 2023-01-27 ENCOUNTER — TELEPHONE (OUTPATIENT)
Dept: CARDIOLOGY | Facility: CLINIC | Age: 88
End: 2023-01-27
Payer: OTHER GOVERNMENT

## 2023-01-27 NOTE — TELEPHONE ENCOUNTER
----- Message from Leda Gilman sent at 1/27/2023  9:06 AM CST -----  Contact: wife  Type:  Needs Medical Advice    Who Called: wife    Would the patient rather a call back or a response via [facility]? call  Best Call Back Number: [phone] (home)     Additional Information: Pt wife has some concerns. She is asking for a call back ASAP before his colonoscopy.

## 2023-01-27 NOTE — TELEPHONE ENCOUNTER
Spoke to wife and advised anesthesia will consult with Dr Cooper if there are any concerns with cardiac status

## 2023-02-01 PROBLEM — K92.2 ACUTE LOWER GI BLEEDING: Status: RESOLVED | Noted: 2023-01-16 | Resolved: 2023-02-01

## 2023-02-01 PROBLEM — D64.9 SYMPTOMATIC ANEMIA: Status: RESOLVED | Noted: 2023-02-01 | Resolved: 2023-02-01

## 2023-02-01 PROBLEM — D64.9 SYMPTOMATIC ANEMIA: Status: ACTIVE | Noted: 2023-02-01

## 2023-02-06 ENCOUNTER — TELEPHONE (OUTPATIENT)
Dept: GASTROENTEROLOGY | Facility: CLINIC | Age: 88
End: 2023-02-06
Payer: OTHER GOVERNMENT

## 2023-02-06 NOTE — TELEPHONE ENCOUNTER
Per Dr. Naranjo-Patient was just d/c'd from hospital.  He and wife aware of the pathology (BENIGN).  No f/o needed, unless he bleeds again.

## 2023-02-08 ENCOUNTER — TELEPHONE (OUTPATIENT)
Dept: CARDIOLOGY | Facility: CLINIC | Age: 88
End: 2023-02-08
Payer: OTHER GOVERNMENT

## 2023-02-08 NOTE — TELEPHONE ENCOUNTER
----- Message from Nereyda Del Rosario sent at 2/8/2023  1:47 PM CST -----  Contact: Grace (spouse) [phone]  Type: Needs Medical Advice    Who Called:  Pt     Best Call Back Number: [phone] (home)     Additional Information: Patient is calling office to speak with nurse/MA regarding procedure that's scheduled for tomorrow. Patient would like to know if he's strong enough to have this procedure due to his weight.   Please call back and advise. Thanks

## 2023-02-09 PROBLEM — R93.89 ABNORMAL ANGIOGRAM: Status: ACTIVE | Noted: 2023-02-09

## 2023-02-15 PROBLEM — Z87.74 HISTORY OF ARTERIOVENOUS MALFORMATION (AVM): Status: ACTIVE | Noted: 2023-02-15

## 2023-02-15 PROBLEM — Z86.010 HISTORY OF COLONIC POLYPS: Status: ACTIVE | Noted: 2023-02-15

## 2023-02-17 ENCOUNTER — OFFICE VISIT (OUTPATIENT)
Dept: CARDIOLOGY | Facility: CLINIC | Age: 88
End: 2023-02-17
Payer: OTHER GOVERNMENT

## 2023-02-17 VITALS
HEART RATE: 75 BPM | HEIGHT: 66 IN | BODY MASS INDEX: 20.55 KG/M2 | WEIGHT: 127.88 LBS | DIASTOLIC BLOOD PRESSURE: 57 MMHG | SYSTOLIC BLOOD PRESSURE: 113 MMHG

## 2023-02-17 DIAGNOSIS — I25.10 CORONARY ARTERY DISEASE INVOLVING NATIVE CORONARY ARTERY OF NATIVE HEART WITHOUT ANGINA PECTORIS: ICD-10-CM

## 2023-02-17 DIAGNOSIS — I10 ESSENTIAL HYPERTENSION: ICD-10-CM

## 2023-02-17 DIAGNOSIS — I35.0 AORTIC VALVE STENOSIS, ETIOLOGY OF CARDIAC VALVE DISEASE UNSPECIFIED: ICD-10-CM

## 2023-02-17 DIAGNOSIS — I44.2 AV BLOCK, COMPLETE: ICD-10-CM

## 2023-02-17 PROCEDURE — 99999 PR PBB SHADOW E&M-EST. PATIENT-LVL V: CPT | Mod: PBBFAC,,,

## 2023-02-17 PROCEDURE — 99214 OFFICE O/P EST MOD 30 MIN: CPT | Mod: S$PBB,,,

## 2023-02-17 PROCEDURE — 99214 PR OFFICE/OUTPT VISIT, EST, LEVL IV, 30-39 MIN: ICD-10-PCS | Mod: S$PBB,,,

## 2023-02-17 PROCEDURE — 99215 OFFICE O/P EST HI 40 MIN: CPT | Mod: PBBFAC,PO

## 2023-02-17 PROCEDURE — 99999 PR PBB SHADOW E&M-EST. PATIENT-LVL V: ICD-10-PCS | Mod: PBBFAC,,,

## 2023-02-17 NOTE — PROGRESS NOTES
Subjective:    Patient ID:  Shan Warner is a 89 y.o. male patient here for evaluation Follow-up      History of Present Illness:     Mr. Torrez is a 89 year old M who follows with Dr. Castellanos here today for a [facility] follow up. He was admitted with NSTEMI and GI bleed earlier this month and subsequently underwent staged PCI of the RCA on Feb 9, 2023. He is feeling good since the procedure but still does have some CHOWDHURY= NHYA class II symptoms likely due to severe AS. Femoral access site with some residual bruising but it is soft to touch and healing well.   No chest pain, dizziness, syncope, falls, palpitations, swelling in legs. No further GI symptoms- EGD w mild gastritis, colonoscopy three bleeding ulcers at hepatic flexure resulting in six clips placed, some polyps as well.         Review of patient's allergies indicates:   Allergen Reactions    Meclizine hcl Other (See Comments)    Meloxicam     Methocarbamol Hallucinations    Montelukast     Morphine Hallucinations    Rosuvastatin Other (See Comments)    Tramadol Hallucinations    Xyzal [levocetirizine]     Aleve [naproxen sodium] Nausea Only    Rivastigmine Itching and Rash       Past Medical History:   Diagnosis Date    Arthritis     Dementia     Diabetes mellitus     CHOWDHURY (dyspnea on exertion)     Essential hypertension 05/03/2020    HEARING LOSS     Leukemia     Rash      Past Surgical History:   Procedure Laterality Date    A-V CARDIAC PACEMAKER INSERTION Left 12/31/2021    Procedure: INSERTION, CARDIAC PACEMAKER, DUAL CHAMBER;  Surgeon: Pastor Castellanos MD;  Location: [facility] CATH;  Service: Cardiology;  Laterality: Left;    ANGIOGRAM, CORONARY, WITH LEFT HEART CATHETERIZATION N/A 01/06/2023    Procedure: Angiogram, Coronary, with Left Heart Cath;  Surgeon: Pastor Castellanos MD;  Location: [facility] CATH;  Service: Cardiology;  Laterality: N/A;    CATARACT EXTRACTION      CHOLECYSTECTOMY      COLONOSCOPY N/A 04/26/2021    Procedure: COLONOSCOPY;   Surgeon: Isma Mari MD;  Location: [facility];  Service: General;  Laterality: N/A; colon polyps per patient report    COLONOSCOPY N/A 01/14/2023    Procedure: COLONOSCOPY;  Surgeon: Bonilla Rodriguez MD;  Location: [facility];  Service: Endoscopy;  Laterality: N/A;    COLONOSCOPY N/A 01/12/2023    Procedure: COLONOSCOPY;  Surgeon: Tyree Naranjo Jr., MD;  Location: [facility] ENDO;  Service: Endoscopy;  Laterality: N/A;    COLONOSCOPY N/A 01/27/2023    Procedure: COLONOSCOPY;  Surgeon: Tyree Naranjo Jr., MD;  Location: [facility] ENDO;  Service: Endoscopy;  Laterality: N/A;    CYST REMOVAL      CYSTOSCOPY N/A 05/04/2020    Procedure: CYSTOSCOPY;  Surgeon: Slim Mari MD;  Location: [facility];  Service: Urology;  Laterality: N/A;    ESOPHAGOGASTRODUODENOSCOPY N/A 04/25/2021    Procedure: EGD (ESOPHAGOGASTRODUODENOSCOPY);  Surgeon: Isma Mari MD;  Location: [facility];  Service: General;  Laterality: N/A;    ESOPHAGOGASTRODUODENOSCOPY N/A 06/09/2022    Procedure: EGD (ESOPHAGOGASTRODUODENOSCOPY);  Surgeon: Lamin Owens MD;  Location: [facility];  Service: Endoscopy;  Laterality: N/A;    ESOPHAGOGASTRODUODENOSCOPY N/A 01/12/2023    Procedure: EGD (ESOPHAGOGASTRODUODENOSCOPY);  Surgeon: Tyree Naranjo Jr., MD;  Location: [facility];  Service: Endoscopy;  Laterality: N/A;    HERNIA REPAIR      X2    JOINT REPLACEMENT Right     shoulder    JOINT REPLACEMENT Right     knee    JOINT REPLACEMENT Left     knee    LITHOTRIPSY      PERCUTANEOUS CORONARY INTERVENTION, ARTERY N/A 2/9/2023    Procedure: Percutaneous coronary intervention;  Surgeon: Pastor Castellanos MD;  Location: [facility];  Service: Cardiology;  Laterality: N/A;    PROSTATE SURGERY      REMOVAL OF BLOOD CLOT N/A 05/04/2020    Procedure: REMOVAL, BLOOD CLOT;  Surgeon: Slim Mari MD;  Location: [facility] OR;  Service: Urology;  Laterality: N/A;    TONSILLECTOMY      TRANSURETHRAL RESECTION OF PROSTATE N/A 05/04/2020    Procedure:  TURP (TRANSURETHRAL RESECTION OF PROSTATE);  Surgeon: Slim Mari MD;  Location: [facility] OR;  Service: Urology;  Laterality: N/A;     Social History     Tobacco Use    Smoking status: Former     Types: Pipe    Smokeless tobacco: Never   Substance Use Topics    Alcohol use: Yes     Comment: only for special occasions    Drug use: Never        REVIEW OF SYSTEMS: As noted in HPI   CARDIOVASCULAR: No recent chest pain, palpitations, arm, neck, or jaw pain  RESPIRATORY: No recent fever, cough chills, SOB or congestion  : No blood in the urine  GI: No Nausea, vomiting, constipation, diarrhea, blood, or reflux.  MUSCULOSKELETAL: No myalgias  NEURO: No lightheadedness or dizziness  EYES: No Double vision, blurry, vision or headache        Objective        Vitals:    02/17/23 0930   BP: (!) 113/57   Pulse: 75       LIPIDS - LAST 2   Lab Results   Component Value Date    CHOL 113 (L) 09/03/2020    CHOL 121 07/17/2020    HDL 25 (L) 09/03/2020    HDL 26 (L) 07/17/2020    LDLCALC 62.4 (L) 09/03/2020    LDLCALC 73.6 07/17/2020    TRIG 128 09/03/2020    TRIG 107 07/17/2020    CHOLHDL 22.1 09/03/2020    CHOLHDL 21.5 07/17/2020       CBC - LAST 2  Lab Results   Component Value Date    WBC 9.75 02/09/2023    WBC 14.07 (H) 02/01/2023    RBC 3.66 (L) 02/09/2023    RBC 3.23 (L) 02/01/2023    HGB 10.0 (L) 02/09/2023    HGB 9.2 (L) 02/01/2023    HCT 31.2 (L) 02/09/2023    HCT 27.5 (L) 02/01/2023    MCV 85 02/09/2023    MCV 85 02/01/2023    MCH 27.3 02/09/2023    MCH 28.5 02/01/2023    MCHC 32.1 02/09/2023    MCHC 33.5 02/01/2023    RDW 15.9 (H) 02/09/2023    RDW 15.3 (H) 02/01/2023     (L) 02/09/2023     (L) 02/01/2023    MPV 11.4 02/09/2023    MPV 11.4 02/01/2023    GRAN 8.5 (H) 02/01/2023    GRAN 60.4 02/01/2023    LYMPH 1.3 02/01/2023    LYMPH 9.5 (L) 02/01/2023    MONO 3.6 (H) 02/01/2023    MONO 25.4 (H) 02/01/2023    BASO 0.04 02/01/2023    BASO CANCELED 09/03/2020    NRBC 0 02/01/2023    NRBC 0 01/31/2023        CHEMISTRY & LIVER FUNCTION - LAST 2  Lab Results   Component Value Date     (L) 02/01/2023     01/31/2023    K 4.0 02/01/2023    K 4.0 01/31/2023     02/01/2023     01/31/2023    CO2 27 02/01/2023    CO2 26 01/31/2023    ANIONGAP 6 (L) 02/01/2023    ANIONGAP 6 (L) 01/31/2023    BUN 18 02/01/2023    BUN 18 01/31/2023    CREATININE 0.89 02/01/2023    CREATININE 0.86 01/31/2023     (H) 02/01/2023     (H) 01/31/2023    CALCIUM 8.5 02/01/2023    CALCIUM 8.6 01/31/2023    MG 1.5 (L) 04/27/2021    ALBUMIN 4.4 01/26/2023    ALBUMIN 3.8 01/15/2023    PROT 7.8 01/26/2023    PROT 6.7 01/15/2023    ALKPHOS 109 01/26/2023    ALKPHOS 84 01/15/2023    ALT 14 01/26/2023    ALT 15 01/15/2023    AST 29 01/26/2023    AST 27 01/15/2023    BILITOT 0.6 01/26/2023    BILITOT 0.8 01/15/2023        CARDIAC PROFILE - LAST 2  Lab Results   Component Value Date    BNP 88 01/05/2022    TROPONINI <0.012 12/31/2021        COAGULATION - LAST 2  Lab Results   Component Value Date    LABPT 16.6 (H) 01/26/2023    LABPT 15.3 (H) 06/08/2022    INR 1.3 01/26/2023    INR 1.2 06/08/2022    APTT 46.6 (H) 01/26/2023       ENDOCRINE & PSA - LAST 2  Lab Results   Component Value Date    HGBA1C 5.1 01/28/2023    HGBA1C 5.4 06/09/2022    TSH 3.133 09/03/2020    TSH 3.025 07/17/2020    PSA 1.7 09/03/2020        ECHOCARDIOGRAM RESULTS  Results for orders placed in visit on 12/09/22    Echo    Interpretation Summary  · The left ventricle is normal in size with concentric remodeling and normal systolic function.  · The estimated ejection fraction is 55%.  · Grade I left ventricular diastolic dysfunction.  · Normal right ventricular size with normal right ventricular systolic function.  · Moderate left atrial enlargement.  · There is moderate-to-severe aortic valve stenosis.  · Aortic valve area is 0.84 cm2; peak velocity is 3.6 m/s; mean gradient is 29 mmHg.  · Mild mitral regurgitation.  · Normal central venous pressure  (3 mmHg).  · The estimated PA systolic pressure is 34 mmHg.      CURRENT/PREVIOUS VISIT EKG  Results for orders placed or performed during the hospital encounter of 02/09/23   EKG 12-lead    Collection Time: 02/09/23  1:11 PM    Narrative    Test Reason : Z95.9,    Vent. Rate : 060 BPM     Atrial Rate : 060 BPM     P-R Int : 176 ms          QRS Dur : 174 ms      QT Int : 532 ms       P-R-T Axes : -29 -69 093 degrees     QTc Int : 532 ms    AV dual-paced rhythm  Abnormal ECG  When compared with ECG of 09-FEB-2023 10:05,  No significant change was found  Confirmed by Marla Nam ([id number]) on 2/12/2023 6:48:10 PM    Referred By:             Confirmed By:Marla Nam     No valid procedures specified.   Results for orders placed during the hospital encounter of 05/04/22    Nuclear Stress - Cardiology Interpreted    Interpretation Summary    Abnormal myocardial perfusion scan.    There is a mild intensity, small sized, reversible perfusion abnormality that is consistent with ischemia in the mid to distal inferoseptal wall(s).    There are no other significant perfusion abnormalities.    The gated perfusion images showed an ejection fraction of 63% at rest.    There is normal wall motion at rest.    The EKG portion of this study is uninterpretable for ischemia secondary to paced rhythm.    There are no prior studies for comparison.        PHYSICAL EXAM  CONSTITUTIONAL: Well built, well nourished in no apparent distress  NECK: no carotid bruit, no JVD  LUNGS: CTA  CHEST WALL: no tenderness  HEART: late peaking systolic murmur radiated to the carotids   ABDOMEN: soft, non-tender; bowel sounds normal; no masses,  no organomegaly  EXTREMITIES: Extremities normal, no edema, no calf tenderness noted  NEURO: AAO X 3    I HAVE REVIEWED :    The vital signs, nurses notes, and all the pertinent radiology and labs.    Current Outpatient Medications   Medication Instructions    acetaminophen (TYLENOL) 325 mg, Oral, 2 times daily     amoxicillin (AMOXIL) 2,000 mg, Oral, Once as needed    azelastine (ASTELIN) 137 mcg (0.1 %) nasal spray 1 spray, Nasal, Daily PRN    budesonide-formoterol 160-4.5 mcg (SYMBICORT) 160-4.5 mcg/actuation HFAA 2 puffs, Inhalation, Every 12 hours PRN, Controller     camphor-menthol 0.5-0.5% (SARNA) lotion Topical (Top), 2 times daily PRN    cetirizine (ZYRTEC) 10 mg, Oral, Daily, at noon    clopidogreL (PLAVIX) 75 mg, Oral, Daily    clotrimazole (LOTRIMIN) 1 % Soln 1 application, Topical (Top), Nightly, to toenails    cyanocobalamin 500 mcg, Oral, 2 times daily    donepeziL (ARICEPT) 5 mg, Oral, Nightly    finasteride (PROSCAR) 5 mg, Oral, Daily    folic acid (FOLVITE) 1 mg, Oral, Daily    furosemide (LASIX) 20 mg, Oral, Every other day    gemfibroziL (LOPID) 600 mg, 2 times daily    guaiFENesin (MUCINEX) 300 mg, Oral, 2 times daily PRN    HYDROPHILIC CREAM TOP 1 application, Topical (Top), 2 times daily PRN    insulin glargine,hum.rec.anlog (BASAGLAR KWIKPEN U-100 INSULIN SUBQ) 17 Units, Subcutaneous, Nightly    insulin lispro 17-18 Units, Subcutaneous, 3 times daily before meals PRN    ipratropium (ATROVENT) 21 mcg (0.03 %) nasal spray 2 sprays, Each Nostril, 3 times daily    magnesium oxide (MAG-OX) 400 mg, Oral, 2 times daily    melatonin 10 mg, Oral, Nightly    metoprolol succinate (TOPROL-XL) 25 mg, Oral, Nightly    multivit-min/folic/vit K/lycop (ONE-A-DAY MEN'S MULTIVITAMIN ORAL) 1 tablet, Oral, Nightly    nateglinide (STARLIX) 120 mg, Oral, 3 times daily before meals    ONETOUCH VERIO TEST STRIPS Strp 1 strip, Misc.(Non-Drug; Combo Route), 4 times daily, to check blood glucose    pantoprazole (PROTONIX) 40 mg, Oral, 2 times daily    peg 400-propylene glycol 0.4-0.3 % Drop 1 drop, Both Eyes, 4 times daily PRN    polyethylene glycol (GLYCOLAX) 17 g, Oral, Daily PRN    potassium chloride (KLOR-CON) 10 MEQ TbSR 10 mEq, Oral, Every other day    psyllium husk (METAMUCIL) 3.4 gram/5.4 gram Powd 5 mLs, Oral, Daily PRN     simethicone (MYLICON) 125 mg, Oral, Every 6 hours PRN    SITagliptin phosphate (JANUVIA) 50 mg, Oral, Daily, at noon    tamsulosin (FLOMAX) 0.4 mg, Oral, Daily, at noon    triamcinolone acetonide 0.1% (KENALOG) 0.1 % ointment 1 application, 2 times daily PRN, Do not use on face, axilla and/or groin area        Assessment & Plan     Essential hypertension  BP well controlled   Continue Toprol and lasix at present dosing     AV block, complete  S/p PPM in situ   Normal device function 11/2022       Aortic stenosis  Will reach out to valve coordinator at [facility] to set up for TAVR eval   NHYA class II-III symptoms despite coronary revasc     Coronary artery disease involving native coronary artery  S/P PCI to RCA   No chest pain, continues with CHOWDHURY  Femoral access site healing well   Continue DAPT, no bleeding tendencies noted             Follow up 3 months Dr. Castellanos after TAVR w/ [facility].

## 2023-02-17 NOTE — ASSESSMENT & PLAN NOTE
Will reach out to valve coordinator at [facility] to set up for TAVR eval   NHYA class II-III symptoms despite coronary revasc

## 2023-02-17 NOTE — ASSESSMENT & PLAN NOTE
S/P PCI to RCA   No chest pain, continues with CHOWDHURY  Femoral access site healing well   Continue DAPT, no bleeding tendencies noted

## 2023-03-10 ENCOUNTER — TELEPHONE (OUTPATIENT)
Dept: CARDIOLOGY | Facility: HOSPITAL | Age: 88
End: 2023-03-10
Payer: OTHER GOVERNMENT

## 2023-03-10 ENCOUNTER — TELEPHONE (OUTPATIENT)
Dept: CARDIOLOGY | Facility: CLINIC | Age: 88
End: 2023-03-10
Payer: OTHER GOVERNMENT

## 2023-03-10 DIAGNOSIS — R06.09 DOE (DYSPNEA ON EXERTION): Primary | ICD-10-CM

## 2023-03-10 DIAGNOSIS — I25.10 CORONARY ARTERY DISEASE INVOLVING NATIVE CORONARY ARTERY OF NATIVE HEART WITHOUT ANGINA PECTORIS: ICD-10-CM

## 2023-03-10 RX ORDER — FUROSEMIDE 20 MG/1
20 TABLET ORAL EVERY OTHER DAY
Qty: 45 TABLET | Refills: 3 | Status: ON HOLD | OUTPATIENT
Start: 2023-03-10 | End: 2023-04-19 | Stop reason: SDUPTHER

## 2023-03-10 NOTE — TELEPHONE ENCOUNTER
----- Message from Fina Pang LPN sent at 3/9/2023  4:58 PM CST -----  Contact: Spouse/Celia    ----- Message -----  From: Eric Alas  Sent: 3/9/2023  12:28 PM CST  To: Jasmin BENJAMIN Staff    Type: Needs Medical Advice  Who Called:  Spouse    Best Call Back Number: [phone]   Additional Information: Called regarding pt's [facility] referral. Please call.

## 2023-03-21 ENCOUNTER — TELEPHONE (OUTPATIENT)
Dept: CARDIOLOGY | Facility: CLINIC | Age: 88
End: 2023-03-21
Payer: OTHER GOVERNMENT

## 2023-03-21 NOTE — TELEPHONE ENCOUNTER
----- Message from Honey Wang sent at 3/21/2023  3:30 PM CDT -----  Contact:  office  Type: Needs Medical Advice    Who Called:Dr [place] Call Back Number:[phone] Dejuan   Additional Information Requesting a call back regarding Nurse was calling to speak with office in regards to getting a referral sent over to there office in regards pt getting an evaluation for a Tavr surgery office stated they need a referral faxed over to [phone]  Please Advise-Thank you

## 2023-03-29 ENCOUNTER — TELEPHONE (OUTPATIENT)
Dept: CARDIOLOGY | Facility: CLINIC | Age: 88
End: 2023-03-29

## 2023-03-29 ENCOUNTER — CLINICAL SUPPORT (OUTPATIENT)
Dept: CARDIOLOGY | Facility: HOSPITAL | Age: 88
End: 2023-03-29
Payer: OTHER GOVERNMENT

## 2023-03-29 DIAGNOSIS — Z95.0 PRESENCE OF CARDIAC PACEMAKER: ICD-10-CM

## 2023-03-29 PROCEDURE — 93294 CARDIAC DEVICE CHECK - REMOTE: ICD-10-PCS | Mod: ,,, | Performed by: INTERNAL MEDICINE

## 2023-03-29 PROCEDURE — 93294 REM INTERROG EVL PM/LDLS PM: CPT | Mod: ,,, | Performed by: INTERNAL MEDICINE

## 2023-03-29 PROCEDURE — 93296 REM INTERROG EVL PM/IDS: CPT | Mod: PO | Performed by: INTERNAL MEDICINE

## 2023-03-29 NOTE — TELEPHONE ENCOUNTER
----- Message from Zina Moran LPN sent at 3/24/2023  2:17 PM CDT -----  Contact: Dejuan jones/ Dr. Sravan Bethea's office @ [phone]    ----- Message -----  From: Cecelia Washington  Sent: 3/24/2023  12:53 PM CDT  To: Jasmin BENJAMIN Staff    Type:  Needs Medical Advice    Who Called: Dejuan jones/ Dr. Sravan Bethea's office    Would the patient rather a call back or a response via [facility]? Call Dejuan  Best Call Back Number: [phone]  Additional Information: Dejuan is calling in regards to a referral for pt. Dejuan is saying that the [facility] needs a response as to what the next steps are. Please call Dejuan back to advise. Dejuan said she left a message on Wednesday,and she hasn't heard anything back.

## 2023-03-30 ENCOUNTER — TELEPHONE (OUTPATIENT)
Dept: CARDIOLOGY | Facility: CLINIC | Age: 88
End: 2023-03-30
Payer: OTHER GOVERNMENT

## 2023-03-30 NOTE — TELEPHONE ENCOUNTER
N/A AT THIS TIME, ALL INFO WAS FAXED TO THE [facility] 3/29/23 FOR CONTINUATION OF SERVICES          ----- Message from Kaela Allen sent at 3/30/2023 10:40 AM CDT -----  Contact: Patient wife  Type:  Needs Medical Advice    Who Called:  Patient's wifeGrace     Would the patient rather a call back or a response via [facility]? Call     Best Call Back Number: [phone] (home)      Additional Information: Patient's wife, Grace would like to speak with the nurse in regards to a referral that she has some questions about.     Please call to advise

## 2023-04-06 ENCOUNTER — OFFICE VISIT (OUTPATIENT)
Dept: VASCULAR SURGERY | Facility: CLINIC | Age: 88
End: 2023-04-06
Payer: OTHER GOVERNMENT

## 2023-04-06 VITALS
SYSTOLIC BLOOD PRESSURE: 128 MMHG | WEIGHT: 127.75 LBS | HEART RATE: 67 BPM | HEIGHT: 68 IN | BODY MASS INDEX: 19.36 KG/M2 | DIASTOLIC BLOOD PRESSURE: 58 MMHG

## 2023-04-06 DIAGNOSIS — I35.0 NONRHEUMATIC AORTIC VALVE STENOSIS: Primary | ICD-10-CM

## 2023-04-06 PROCEDURE — 99215 OFFICE O/P EST HI 40 MIN: CPT | Mod: PBBFAC,PN | Performed by: THORACIC SURGERY (CARDIOTHORACIC VASCULAR SURGERY)

## 2023-04-06 PROCEDURE — 99999 PR PBB SHADOW E&M-EST. PATIENT-LVL V: ICD-10-PCS | Mod: PBBFAC,,, | Performed by: THORACIC SURGERY (CARDIOTHORACIC VASCULAR SURGERY)

## 2023-04-06 PROCEDURE — 99999 PR PBB SHADOW E&M-EST. PATIENT-LVL V: CPT | Mod: PBBFAC,,, | Performed by: THORACIC SURGERY (CARDIOTHORACIC VASCULAR SURGERY)

## 2023-04-06 PROCEDURE — 99204 PR OFFICE/OUTPT VISIT, NEW, LEVL IV, 45-59 MIN: ICD-10-PCS | Mod: S$PBB,,, | Performed by: THORACIC SURGERY (CARDIOTHORACIC VASCULAR SURGERY)

## 2023-04-06 PROCEDURE — 99204 OFFICE O/P NEW MOD 45 MIN: CPT | Mod: S$PBB,,, | Performed by: THORACIC SURGERY (CARDIOTHORACIC VASCULAR SURGERY)

## 2023-04-06 NOTE — PROGRESS NOTES
This patient has symptomatic aortic stenosis.  He was referred for consideration of transaortic valvular replacement.  He has chronic hypertension and diabetes.  Other medical problems are described in the epic record.    His medicines are noted.  He did have a coronary stent and pacemaker in the recent past.  Has had gastrointestinal bleeding over the last few months as well.  This has been treated with colonoscopy and cauterization of polyps apparently.    He is not a smoker.  He is fairly functional despite his age.    On exam vital signs are stable.  Pupils are equal and round reactive to light.  Neck is supple.  Chest is equal breath sounds.  Heart is in a regular rate and rhythm.  Abdomen is benign.  Perfusion to the legs and feet seems to be satisfactory.    The patient has symptomatic aortic stenosis.  Recommendation is for TAVR.  The patient seems to be understanding.  This has been scheduled in the next week or so.  I did discuss with him the risks and potential complications.  He and his wife seem to be understanding.

## 2023-04-12 ENCOUNTER — PATIENT MESSAGE (OUTPATIENT)
Dept: CARDIOLOGY | Facility: CLINIC | Age: 88
End: 2023-04-12
Payer: OTHER GOVERNMENT

## 2023-04-17 ENCOUNTER — TELEPHONE (OUTPATIENT)
Dept: CARDIOLOGY | Facility: CLINIC | Age: 88
End: 2023-04-17
Payer: OTHER GOVERNMENT

## 2023-04-17 NOTE — TELEPHONE ENCOUNTER
----- Message from Juan Daniel Decker sent at 4/17/2023 12:21 PM CDT -----  Type: Needs Medical Advice  Who Called:  pt wife  Symptoms (please be specific):  pt wife wanted to know if the procedure with the dr been approved on 4/19 this Wednesday--please call and advise  Best Call Back Number: [phone] (home)     Additional Information: thank you

## 2023-04-19 PROBLEM — R53.83 FATIGUE: Status: ACTIVE | Noted: 2023-04-19

## 2023-04-19 PROBLEM — I50.30 NYHA CLASS 3 HEART FAILURE WITH PRESERVED EJECTION FRACTION: Status: ACTIVE | Noted: 2023-04-19

## 2023-04-27 PROBLEM — T14.8XXA HEMATOMA: Status: ACTIVE | Noted: 2023-04-27

## 2023-04-28 ENCOUNTER — TELEPHONE (OUTPATIENT)
Dept: CARDIOLOGY | Facility: CLINIC | Age: 88
End: 2023-04-28
Payer: OTHER GOVERNMENT

## 2023-04-28 NOTE — TELEPHONE ENCOUNTER
----- Message from So Singh, Patient Care Assistant sent at 4/28/2023 10:23 AM CDT -----  Regarding: advice  Contact: Grace pt's wife  Type: Needs Medical Advice    Who Called:  Grace pt's wife    Symptoms (please be specific):  procedure f/u     Best Call Back Number: [phone] (home)     Additional Information: please call Grace pt's wife to advise. Thanks!

## 2023-05-04 ENCOUNTER — OFFICE VISIT (OUTPATIENT)
Dept: CARDIOLOGY | Facility: CLINIC | Age: 88
End: 2023-05-04
Payer: OTHER GOVERNMENT

## 2023-05-04 ENCOUNTER — TELEPHONE (OUTPATIENT)
Dept: CARDIOLOGY | Facility: CLINIC | Age: 88
End: 2023-05-04

## 2023-05-04 VITALS
BODY MASS INDEX: 19.58 KG/M2 | HEIGHT: 68 IN | HEART RATE: 60 BPM | WEIGHT: 129.19 LBS | DIASTOLIC BLOOD PRESSURE: 55 MMHG | SYSTOLIC BLOOD PRESSURE: 122 MMHG

## 2023-05-04 DIAGNOSIS — I50.30 NYHA CLASS 3 HEART FAILURE WITH PRESERVED EJECTION FRACTION: ICD-10-CM

## 2023-05-04 DIAGNOSIS — I44.2 AV BLOCK, COMPLETE: ICD-10-CM

## 2023-05-04 DIAGNOSIS — Z95.2 S/P TAVR (TRANSCATHETER AORTIC VALVE REPLACEMENT): ICD-10-CM

## 2023-05-04 DIAGNOSIS — I10 ESSENTIAL HYPERTENSION: ICD-10-CM

## 2023-05-04 DIAGNOSIS — I35.0 SEVERE AORTIC STENOSIS: Primary | ICD-10-CM

## 2023-05-04 PROCEDURE — 99999 PR PBB SHADOW E&M-EST. PATIENT-LVL V: ICD-10-PCS | Mod: PBBFAC,,,

## 2023-05-04 PROCEDURE — 99999 PR PBB SHADOW E&M-EST. PATIENT-LVL V: CPT | Mod: PBBFAC,,,

## 2023-05-04 PROCEDURE — 99214 PR OFFICE/OUTPT VISIT, EST, LEVL IV, 30-39 MIN: ICD-10-PCS | Mod: S$PBB,,,

## 2023-05-04 PROCEDURE — 99215 OFFICE O/P EST HI 40 MIN: CPT | Mod: PBBFAC,PO

## 2023-05-04 PROCEDURE — 99214 OFFICE O/P EST MOD 30 MIN: CPT | Mod: S$PBB,,,

## 2023-05-04 NOTE — ASSESSMENT & PLAN NOTE
Doing well, fatigue and SOB improved   Has echo and follow up scheduled w valve clinic   Groin sites improving

## 2023-05-04 NOTE — PROGRESS NOTES
Subjective:    Patient ID:  Shan Warner is a 89 y.o. male patient here for evaluation Coronary Artery Disease and Hypertension    History of Present Illness:     Mr. Torrez is a pleasant 89 year old M who follows with Dr. Castellanos here today for a TAVR follow up. The procedure went well without any complication and echo day after showed noraml bioprosthetic valve. His energy is better, his SOB is improving but he still does have some residual CHOWDHURY. See ROS.           Most Recent Echocardiogram Results  Results for orders placed during the hospital encounter of 04/19/23    Echo Saline Bubble? No    Interpretation Summary  · The left ventricle is normal in size with mild concentric hypertrophy and normal systolic function.  · Overall the study quality was adequate.  · The estimated ejection fraction is 50 to 55%.  · Normal left ventricular diastolic function.  · Normal right ventricular size with normal right ventricular systolic function.  · There is a 26 mm Medtronic 26 mm Evolut FX transcutaneously-placed aortic bioprosthesis present. There is no aortic insufficiency present. Prosthetic aortic valve is normal.  · The aortic valve mean gradient is 5 mmHg with a dimensionless index of 0.83.  · Mild mitral regurgitation.  · Mild to moderate tricuspid regurgitation.  · Severe left atrial enlargement.  · Mild right atrial enlargement.      Most Recent Nuclear Stress Test Results  Results for orders placed during the hospital encounter of 05/04/22    Nuclear Stress - Cardiology Interpreted    Interpretation Summary    Abnormal myocardial perfusion scan.    There is a mild intensity, small sized, reversible perfusion abnormality that is consistent with ischemia in the mid to distal inferoseptal wall(s).    There are no other significant perfusion abnormalities.    The gated perfusion images showed an ejection fraction of 63% at rest.    There is normal wall motion at rest.    The EKG portion of this study is  uninterpretable for ischemia secondary to paced rhythm.    There are no prior studies for comparison.      Most Recent Cardiovascular Angiogram results  Results for orders placed during the hospital encounter of 04/19/23    Cardiac catheterization    Conclusion  Procedures performed:  Bilateral common femoral artery access using ultrasound and radiographic guidance  Left common femoral vein access/Right internal jugular vein access  Temporary transvenous pacemaker implantation  Simultaneous Left heart and aortic pressure hemodynamics  TAVR using Medtronic FX  26 mm valve    Procedure:  Patient brought to coronary catheterization lab prepped and draped usual sterile fashion.  Anesthesia please see their note.  Bilateral common femoral artery access using modified Seldinger technique under radiographic and ultrasound guidance into which 6 Palestinian sheaths were placed.  Right common femoral vein access earlier by Anesthesia access via modified Seldinger technique under ultrasound radiographic guidance.  A temporary balloon tipped pacemaker catheter advanced  into the right ventricle, loss capture less than 1 ma.  Left common femoral artery sheath removed subsequently dilated using a 8, 10, 12, 14 Palestinian sheath into which a cordis 16 Palestinian sheath was placed.  Patient was then heparinized.  Six Palestinian pigtail catheter advanced up the left common femoral artery placed into the non coronary cusp of the aorta.  Aortic valve was crossed using AL1 catheter and a straight wire via the right common femoral artery.  A pigtail catheter placed in left ventricle and simultaneous hemodynamics performed in the left ventricle and aorta.  A Confida wire then placed in the left ventricle over the pigtail catheter which was then removed.  Medtronic valve was then images to ensure proper positioning on delivery catheter.  26  mm Medtronic Evolut FX valve then advanced over the Confida wire placed into the left ventricle.  Position was  verified a under transesophageal echocardiogram and radiographic guidance.  Rapid pacing performed at 180 beats per minute valve deployed, pacemaker discontinued.  Positioning verified by radiographic and ultrasound guidance with no aortic insufficiency.  Catheters removed.  Pigtail catheter passed back into the left ventricle for simultaneous hemodynamics of the left ventricle and aorta.  After procedure all catheters removed over a 0.35 wires.  A crossover catheter was brought from the left common femoral artery into the right common iliac artery  over a Glidewire which was placed down into the contralateral superficial femoral artery.  A 7 x 20 balloon was placed into the iliac artery for hemodynamic control and prevention of bleeding while sheath was removed.  Balloon was inflated to 4 atmospheres the 16 Occitan sheath removed from the common femoral artery over a wire.  Hemostasis obtained via a manta device deployed without complication.  Hemostasis verified via angiogram.  Balloon was deflated and removed hemostasis obtained via the right common femoral artery via Perclose device.  The right common femoral vein hemostasis obtained via hand-held pressure.  Temporary transvenous pacemaker sutured in place.  Patient brought to recovery in stable condition.    Hemodynamics.  Pre valve replacement;  Left ventricular pressure 147/17/13 mmHg, aortic pressure  125/59/75 mmHg  Post valve replacement;  Left ventricular pressure 141/15/23mmHg, aortic pressure  139/51/87 mmHg.      Impression  Successful TAVR using a Medtronic FX   26 mm valve.    Plan:  Dual antiplatelet therapy minimum 6 months      Other Most Recent Cardiology Results  Results for orders placed during the hospital encounter of 04/19/23    CARDIAC MONITORING STRIPS      REVIEW OF SYSTEMS: As noted in HPI   CARDIOVASCULAR: No recent chest pain, palpitations, arm, neck, or jaw pain.  RESPIRATORY: CHOWDHURY- improving. No recent fever, cough chills.  : No  blood in the urine  GI: No nausea, vomiting, or blood in stool.   MUSCULOSKELETAL: No myalgias or falls.   NEURO: No syncope, lightheadedness, or dizziness.  EYES: No sudden changes in vision.     Past Medical History:   Diagnosis Date    Arthritis     Coronary artery disease     stent    Dementia     Diabetes mellitus     Digestive disorder     CHOWDHURY (dyspnea on exertion)     Essential hypertension 05/03/2020    GI bleed     HEARING LOSS     Leukemia     Pacemaker     Rash     Severe aortic stenosis 04/2023     Past Surgical History:   Procedure Laterality Date    A-V CARDIAC PACEMAKER INSERTION Left 12/31/2021    Procedure: INSERTION, CARDIAC PACEMAKER, DUAL CHAMBER;  Surgeon: Pastor Castellanos MD;  Location: [facility] CATH;  Service: Cardiology;  Laterality: Left;    ANGIOGRAM, CORONARY, WITH LEFT HEART CATHETERIZATION N/A 01/06/2023    Procedure: Angiogram, Coronary, with Left Heart Cath;  Surgeon: Pastor Castellanos MD;  Location: [facility] CATH;  Service: Cardiology;  Laterality: N/A;    CATARACT EXTRACTION      CHOLECYSTECTOMY      COLONOSCOPY N/A 04/26/2021    Procedure: COLONOSCOPY;  Surgeon: Isma Mari MD;  Location: [facility];  Service: General;  Laterality: N/A; colon polyps per patient report    COLONOSCOPY N/A 01/14/2023    Procedure: COLONOSCOPY;  Surgeon: Bonilla Rodriguez MD;  Location: [facility];  Service: Endoscopy;  Laterality: N/A;    COLONOSCOPY N/A 01/12/2023    Procedure: COLONOSCOPY;  Surgeon: Tyree Naranjo Jr., MD;  Location: [facility] ENDO;  Service: Endoscopy;  Laterality: N/A;    COLONOSCOPY N/A 01/27/2023    Procedure: COLONOSCOPY;  Surgeon: Tyree Naranjo Jr., MD;  Location: [facility];  Service: Endoscopy;  Laterality: N/A;    CYST REMOVAL      CYSTOSCOPY N/A 05/04/2020    Procedure: CYSTOSCOPY;  Surgeon: Slim Mari MD;  Location: [facility];  Service: Urology;  Laterality: N/A;    ESOPHAGOGASTRODUODENOSCOPY N/A 04/25/2021    Procedure: EGD (ESOPHAGOGASTRODUODENOSCOPY);   Surgeon: Isma Mari MD;  Location: [facility] ENDO;  Service: General;  Laterality: N/A;    ESOPHAGOGASTRODUODENOSCOPY N/A 06/09/2022    Procedure: EGD (ESOPHAGOGASTRODUODENOSCOPY);  Surgeon: Lamin Owens MD;  Location: [facility] ENDO;  Service: Endoscopy;  Laterality: N/A;    ESOPHAGOGASTRODUODENOSCOPY N/A 01/12/2023    Procedure: EGD (ESOPHAGOGASTRODUODENOSCOPY);  Surgeon: Tyree Naranjo Jr., MD;  Location: [facility] ENDO;  Service: Endoscopy;  Laterality: N/A;    HERNIA REPAIR      X2    JOINT REPLACEMENT Right     shoulder    JOINT REPLACEMENT Right     knee    JOINT REPLACEMENT Left     knee    LITHOTRIPSY      PERCUTANEOUS CORONARY INTERVENTION, ARTERY N/A 2/9/2023    Procedure: Percutaneous coronary intervention;  Surgeon: Pastor Castellanos MD;  Location: [facility] CATH;  Service: Cardiology;  Laterality: N/A;    PROSTATE SURGERY      REMOVAL OF BLOOD CLOT N/A 05/04/2020    Procedure: REMOVAL, BLOOD CLOT;  Surgeon: Slim Mari MD;  Location: [facility] OR;  Service: Urology;  Laterality: N/A;    TONSILLECTOMY      TRANSCATHETER AORTIC VALVE REPLACEMENT (TAVR) Bilateral 4/19/2023    Procedure: (TAVR);  Surgeon: Calvin Berg MD;  Location: [facility] CATH;  Service: Cardiology;  Laterality: Bilateral;    TRANSURETHRAL RESECTION OF PROSTATE N/A 05/04/2020    Procedure: TURP (TRANSURETHRAL RESECTION OF PROSTATE);  Surgeon: Slim Mari MD;  Location: [facility] OR;  Service: Urology;  Laterality: N/A;     Social History     Tobacco Use    Smoking status: Former     Types: Pipe    Smokeless tobacco: Never   Substance Use Topics    Alcohol use: Yes     Comment: only for special occasions    Drug use: Never         Objective      Vitals:    05/04/23 1457   BP: (!) 122/55   Pulse: 60       LAST EKG  Results for orders placed or performed during the hospital encounter of 04/19/23   EKG 12-LEAD starting tomorrow    Collection Time: 04/20/23  7:19 AM    Narrative    Test Reason : Z95.2,    Vent. Rate : 068 BPM     Atrial  Rate : 068 BPM     P-R Int : 170 ms          QRS Dur : 176 ms      QT Int : 488 ms       P-R-T Axes : 053 -69 095 degrees     QTc Int : 518 ms    Atrial-sensed ventricular-paced rhythm  Abnormal ECG  When compared with ECG of 19-APR-2023 08:57,  Vent. rate has increased BY   6 BPM  Confirmed by Marla Nam ([id number]) on 4/26/2023 2:19:40 PM    Referred By: YARON DAVILA MD           Confirmed By:Marla Nam     LIPIDS - LAST 2   Lab Results   Component Value Date    CHOL 113 (L) 09/03/2020    CHOL 121 07/17/2020    HDL 25 (L) 09/03/2020    HDL 26 (L) 07/17/2020    LDLCALC 62.4 (L) 09/03/2020    LDLCALC 73.6 07/17/2020    TRIG 128 09/03/2020    TRIG 107 07/17/2020    CHOLHDL 22.1 09/03/2020    CHOLHDL 21.5 07/17/2020     CARDIAC PROFILE - LAST 2  Lab Results   Component Value Date    BNP 88 01/05/2022    TROPONINI <0.012 12/31/2021      CBC - LAST 2  Lab Results   Component Value Date    WBC 22.96 (H) 04/20/2023    WBC 14.77 (H) 04/18/2023    RBC 3.20 (L) 04/20/2023    RBC 3.53 (L) 04/18/2023    HGB 8.8 (L) 04/20/2023    HGB 9.8 (L) 04/18/2023    HCT 27.5 (L) 04/20/2023    HCT 23.0 (L) 04/19/2023     (L) 04/20/2023     (L) 04/18/2023     Lab Results   Component Value Date    LABPT 14.9 (H) 04/18/2023    LABPT 16.6 (H) 01/26/2023    INR 1.2 04/18/2023    INR 1.3 01/26/2023    APTT 44.9 (H) 04/18/2023    APTT 46.6 (H) 01/26/2023     CHEMISTRY - LAST 2  Lab Results   Component Value Date     04/20/2023     04/18/2023    K 4.3 04/20/2023    K 4.4 04/18/2023     04/20/2023     04/18/2023    CO2 25 04/20/2023    CO2 25 04/18/2023    ANIONGAP 10 04/20/2023    ANIONGAP 12 04/18/2023    BUN 23 (H) 04/20/2023    BUN 27 (H) 04/18/2023    CREATININE 1.17 04/20/2023    CREATININE 1.17 04/18/2023     (H) 04/20/2023     (H) 04/18/2023    CALCIUM 9.6 04/20/2023    CALCIUM 9.7 04/18/2023    PH 7.336 (L) 04/19/2023    MG 1.5 (L) 04/27/2021    ALBUMIN 4.1 04/20/2023    ALBUMIN 4.6  04/18/2023    PROT 7.1 04/20/2023    PROT 7.6 04/18/2023    ALKPHOS 118 04/20/2023    ALKPHOS 138 04/18/2023    ALT 20 04/20/2023    ALT 19 04/18/2023    AST 42 04/20/2023    AST 30 04/18/2023    BILITOT 0.7 04/20/2023    BILITOT 0.7 04/18/2023      ENDOCRINE - LAST 2  Lab Results   Component Value Date    HGBA1C 5.1 01/28/2023    HGBA1C 5.4 06/09/2022    TSH 3.133 09/03/2020    TSH 3.025 07/17/2020        PHYSICAL EXAM  CONSTITUTIONAL: Well built, well nourished in no apparent distress  NECK: no carotid bruit, no JVD  LUNGS: CTA  CHEST WALL: no tenderness  HEART: regular rate and rhythm, S1, S2 normal, no murmur, click, rub or gallop   ABDOMEN: soft, non-tender; bowel sounds normal; no masses,  no organomegaly  EXTREMITIES: Extremities normal, no edema, no calf tenderness noted  NEURO: AAO X 3    I HAVE REVIEWED :    The vital signs, most recent cardiac testing, and most recent pertinent non-cardiology provider notes.    Current Outpatient Medications   Medication Instructions    acetaminophen (TYLENOL) 325 mg, Oral, 2 times daily    aspirin (ECOTRIN) 81 mg, Oral, Daily    azelastine (ASTELIN) 137 mcg (0.1 %) nasal spray 1 spray, Nasal, Daily PRN    budesonide-formoterol 160-4.5 mcg (SYMBICORT) 160-4.5 mcg/actuation HFAA 2 puffs, Inhalation, Every 12 hours PRN, Controller     camphor-menthol 0.5-0.5% (SARNA) lotion Topical (Top), 2 times daily PRN    cetirizine (ZYRTEC) 10 mg, Oral, Daily, at noon    clopidogreL (PLAVIX) 75 mg, Oral, Daily    clotrimazole (LOTRIMIN) 1 % Soln 1 application, Topical (Top), Nightly, to toenails    cyanocobalamin 500 mcg, Oral, 2 times daily    donepeziL (ARICEPT) 5 mg, Oral, Nightly    finasteride (PROSCAR) 5 mg, Oral, Daily    folic acid (FOLVITE) 1 mg, Oral, Daily    furosemide (LASIX) 20 mg, Oral, Daily PRN, Take as needed daily for leg swelling, weight gain more than 3 lb in 24 hours or 7 lb in a week.    gemfibroziL (LOPID) 600 mg, 2 times daily    guaiFENesin (MUCINEX) 300 mg,  Oral, 2 times daily PRN    HYDROPHILIC CREAM TOP 1 application, Topical (Top), 2 times daily PRN    insulin glargine,hum.rec.anlog (BASAGLAR KWIKPEN U-100 INSULIN SUBQ) 17 Units, Subcutaneous, Nightly    insulin lispro 17-18 Units, Subcutaneous, 3 times daily before meals PRN, Sliding scale    ipratropium (ATROVENT) 21 mcg (0.03 %) nasal spray 2 sprays, Each Nostril, 3 times daily    magnesium oxide (MAG-OX) 400 mg, Oral, 2 times daily    melatonin 10 mg, Oral, Nightly    metoprolol succinate (TOPROL-XL) 25 mg, Oral, Nightly    multivit-min/folic/vit K/lycop (ONE-A-DAY MEN'S MULTIVITAMIN ORAL) 1 tablet, Oral, Nightly    nateglinide (STARLIX) 120 mg, Oral, 3 times daily before meals    ONETOUCH VERIO TEST STRIPS Strp 1 strip, Misc.(Non-Drug; Combo Route), 4 times daily, to check blood glucose    pantoprazole (PROTONIX) 40 mg, Oral, 2 times daily    peg 400-propylene glycol 0.4-0.3 % Drop 1 drop, Both Eyes, 4 times daily PRN    polyethylene glycol (GLYCOLAX) 17 g, Oral, Daily PRN    potassium chloride (KLOR-CON) 10 MEQ TbSR 10 mEq, Oral, Every other day    psyllium husk (METAMUCIL) 3.4 gram/5.4 gram Powd 5 mLs, Oral, Daily PRN    simethicone (MYLICON) 125 mg, Oral, Every 6 hours PRN    SITagliptin phosphate (JANUVIA) 50 mg, Oral, Daily    tamsulosin (FLOMAX) 0.4 mg, Oral, Daily, at noon    triamcinolone acetonide 0.1% (KENALOG) 0.1 % ointment 1 application, 2 times daily PRN, Do not use on face, axilla and/or groin area      Assessment & Plan     S/P TAVR (transcatheter aortic valve replacement)  Doing well, fatigue and SOB improved   Has echo and follow up scheduled w valve clinic   Groin sites improving     NYHA class 3 heart failure with preserved ejection fraction  Euvolemic on exam   Weight stable       AV block, complete  S/p Ppm in situ   Clinic device check coming up soon     Essential hypertension  BP well controlled   Continue toprol   Low Na diet       Cardiac rehab referral sent.     Keep all follow ups as  previously scheduled     Dasha Macias PA-C   [facility]   Office: [phone]

## 2023-05-04 NOTE — TELEPHONE ENCOUNTER
----- Message from Radha Calixto LPN sent at 5/4/2023  4:17 PM CDT -----  Contact: Aleksandr    ----- Message -----  From: Tracey Rubio  Sent: 5/4/2023   3:15 PM CDT  To: Jasmin BENJAMIN Staff    Type: Needs Medical Advice  Who Called:  Aleksandr ([facility])    Best Call Back Number: [phone] ext [phone]  Additional Information: Callers wants to speak with someone about pt's orders and request for services  Thank you

## 2023-05-05 ENCOUNTER — TELEPHONE (OUTPATIENT)
Dept: CARDIOLOGY | Facility: CLINIC | Age: 88
End: 2023-05-05
Payer: OTHER GOVERNMENT

## 2023-05-05 NOTE — TELEPHONE ENCOUNTER
----- Message from Radha Calixto LPN sent at 5/4/2023  4:12 PM CDT -----  Contact: Aleksandr    ----- Message -----  From: Tracey Rubio  Sent: 5/4/2023   3:15 PM CDT  To: Jasmin BENJAMIN Staff    Type: Needs Medical Advice  Who Called:  Aleksandr ([facility])    Best Call Back Number: [phone]  Additional Information: Callers wants to speak with someone about pt's orders and request for services  Thank you

## 2023-05-08 ENCOUNTER — TELEPHONE (OUTPATIENT)
Dept: CARDIAC REHAB | Facility: CLINIC | Age: 88
End: 2023-05-08
Payer: OTHER GOVERNMENT

## 2023-05-08 NOTE — TELEPHONE ENCOUNTER
Letter regarding Phase II cardiac rehab was sent to patient, along with telephone # for [facility].  Nilda Cortés RN  Cardiac Rehab Nurse

## 2023-05-10 ENCOUNTER — PATIENT MESSAGE (OUTPATIENT)
Dept: CARDIOLOGY | Facility: CLINIC | Age: 88
End: 2023-05-10
Payer: OTHER GOVERNMENT

## 2023-05-19 ENCOUNTER — CLINICAL SUPPORT (OUTPATIENT)
Dept: CARDIOLOGY | Facility: HOSPITAL | Age: 88
End: 2023-05-19
Attending: NURSE PRACTITIONER
Payer: OTHER GOVERNMENT

## 2023-05-19 VITALS — BODY MASS INDEX: 19.55 KG/M2 | HEART RATE: 60 BPM | HEIGHT: 68 IN | WEIGHT: 129 LBS

## 2023-05-19 DIAGNOSIS — Z95.2 S/P TAVR (TRANSCATHETER AORTIC VALVE REPLACEMENT): ICD-10-CM

## 2023-05-19 LAB
ASCENDING AORTA: 2.65 CM
AV INDEX (PROSTH): 0.6
AV MEAN GRADIENT: 9 MMHG
AV PEAK GRADIENT: 20 MMHG
AV VALVE AREA: 2.8 CM2
AV VELOCITY RATIO: 0.52
BSA FOR ECHO PROCEDURE: 1.68 M2
CV ECHO LV RWT: 0.49 CM
DOP CALC AO PEAK VEL: 2.25 M/S
DOP CALC AO VTI: 48.3 CM
DOP CALC LVOT AREA: 4.7 CM2
DOP CALC LVOT DIAMETER: 2.44 CM
DOP CALC LVOT PEAK VEL: 1.16 M/S
DOP CALC LVOT STROKE VOLUME: 135.07 CM3
DOP CALCLVOT PEAK VEL VTI: 28.9 CM
E WAVE DECELERATION TIME: 454.8 MSEC
E/A RATIO: 0.92
E/E' RATIO: 20.46 M/S
ECHO LV POSTERIOR WALL: 1.06 CM (ref 0.6–1.1)
EJECTION FRACTION: 65 %
FRACTIONAL SHORTENING: 23 % (ref 28–44)
INTERVENTRICULAR SEPTUM: 0.88 CM (ref 0.6–1.1)
IVRT: 91.34 MSEC
LA MAJOR: 4.27 CM
LA MINOR: 5.78 CM
LA WIDTH: 3.5 CM
LEFT ATRIUM SIZE: 3.54 CM
LEFT ATRIUM VOLUME INDEX: 30.4 ML/M2
LEFT ATRIUM VOLUME: 51.73 CM3
LEFT INTERNAL DIMENSION IN SYSTOLE: 3.33 CM (ref 2.1–4)
LEFT VENTRICLE DIASTOLIC VOLUME INDEX: 49.6 ML/M2
LEFT VENTRICLE DIASTOLIC VOLUME: 84.32 ML
LEFT VENTRICLE MASS INDEX: 81 G/M2
LEFT VENTRICLE SYSTOLIC VOLUME INDEX: 26.6 ML/M2
LEFT VENTRICLE SYSTOLIC VOLUME: 45.15 ML
LEFT VENTRICULAR INTERNAL DIMENSION IN DIASTOLE: 4.33 CM (ref 3.5–6)
LEFT VENTRICULAR MASS: 138.14 G
LV LATERAL E/E' RATIO: 19 M/S
LV SEPTAL E/E' RATIO: 22.17 M/S
LVOT MG: 3.36 MMHG
LVOT MV: 0.87 CM/S
MV PEAK A VEL: 1.44 M/S
MV PEAK E VEL: 1.33 M/S
MV STENOSIS PRESSURE HALF TIME: 131.89 MS
MV VALVE AREA P 1/2 METHOD: 1.67 CM2
PISA MRMAX VEL: 6.2 M/S
PISA TR MAX VEL: 2.89 M/S
PULM VEIN S/D RATIO: 1.21
PV PEAK D VEL: 0.56 M/S
PV PEAK S VEL: 0.68 M/S
RA MAJOR: 4.62 CM
RA PRESSURE: 3 MMHG
RA WIDTH: 3.6 CM
RIGHT VENTRICULAR END-DIASTOLIC DIMENSION: 4.37 CM
RIGHT VENTRICULAR LENGTH IN DIASTOLE (APICAL 4-CHAMBER VIEW): 3.98 CM
RV MID DIAMA: 3.21 CM
RV TISSUE DOPPLER FREE WALL SYSTOLIC VELOCITY 1 (APICAL 4 CHAMBER VIEW): 0.02 CM/S
SINUS: 2.43 CM
STJ: 2.53 CM
TDI LATERAL: 0.07 M/S
TDI SEPTAL: 0.06 M/S
TDI: 0.07 M/S
TR MAX PG: 33 MMHG
TRICUSPID ANNULAR PLANE SYSTOLIC EXCURSION: 2.59 CM
TV REST PULMONARY ARTERY PRESSURE: 36 MMHG

## 2023-05-19 PROCEDURE — 93306 ECHO (CUPID ONLY): ICD-10-PCS | Mod: 26,,, | Performed by: INTERNAL MEDICINE

## 2023-05-19 PROCEDURE — 93306 TTE W/DOPPLER COMPLETE: CPT | Mod: 26,,, | Performed by: INTERNAL MEDICINE

## 2023-05-19 PROCEDURE — 93306 TTE W/DOPPLER COMPLETE: CPT | Mod: PO

## 2023-05-22 PROBLEM — Z87.19 HISTORY OF GI BLEED: Status: ACTIVE | Noted: 2023-05-22

## 2023-05-22 PROBLEM — I95.9 HYPOTENSION: Status: ACTIVE | Noted: 2023-05-22

## 2023-05-26 ENCOUNTER — CLINICAL SUPPORT (OUTPATIENT)
Dept: CARDIOLOGY | Facility: HOSPITAL | Age: 88
End: 2023-05-26
Attending: INTERNAL MEDICINE
Payer: OTHER GOVERNMENT

## 2023-05-26 ENCOUNTER — OFFICE VISIT (OUTPATIENT)
Dept: CARDIOLOGY | Facility: CLINIC | Age: 88
End: 2023-05-26
Payer: OTHER GOVERNMENT

## 2023-05-26 VITALS
DIASTOLIC BLOOD PRESSURE: 52 MMHG | WEIGHT: 129.44 LBS | HEART RATE: 71 BPM | HEIGHT: 68 IN | BODY MASS INDEX: 19.62 KG/M2 | SYSTOLIC BLOOD PRESSURE: 138 MMHG

## 2023-05-26 DIAGNOSIS — Z95.0 CARDIAC PACEMAKER IN SITU: ICD-10-CM

## 2023-05-26 DIAGNOSIS — I10 ESSENTIAL HYPERTENSION: ICD-10-CM

## 2023-05-26 DIAGNOSIS — Z95.0 PACEMAKER: ICD-10-CM

## 2023-05-26 DIAGNOSIS — Z95.2 S/P TAVR (TRANSCATHETER AORTIC VALVE REPLACEMENT): Primary | ICD-10-CM

## 2023-05-26 DIAGNOSIS — I25.10 CORONARY ARTERY DISEASE INVOLVING NATIVE CORONARY ARTERY OF NATIVE HEART WITHOUT ANGINA PECTORIS: ICD-10-CM

## 2023-05-26 DIAGNOSIS — I44.2 AV BLOCK, COMPLETE: ICD-10-CM

## 2023-05-26 PROCEDURE — 99214 PR OFFICE/OUTPT VISIT, EST, LEVL IV, 30-39 MIN: ICD-10-PCS | Mod: S$PBB,,, | Performed by: INTERNAL MEDICINE

## 2023-05-26 PROCEDURE — 93280 CARDIAC DEVICE CHECK - IN CLINIC & HOSPITAL: ICD-10-PCS | Mod: 26,,, | Performed by: INTERNAL MEDICINE

## 2023-05-26 PROCEDURE — 93280 PM DEVICE PROGR EVAL DUAL: CPT | Mod: 26,,, | Performed by: INTERNAL MEDICINE

## 2023-05-26 PROCEDURE — 99213 OFFICE O/P EST LOW 20 MIN: CPT | Mod: PBBFAC,PO | Performed by: INTERNAL MEDICINE

## 2023-05-26 PROCEDURE — 99999 PR PBB SHADOW E&M-EST. PATIENT-LVL III: CPT | Mod: PBBFAC,,, | Performed by: INTERNAL MEDICINE

## 2023-05-26 PROCEDURE — 99214 OFFICE O/P EST MOD 30 MIN: CPT | Mod: S$PBB,,, | Performed by: INTERNAL MEDICINE

## 2023-05-26 PROCEDURE — 99999 PR PBB SHADOW E&M-EST. PATIENT-LVL III: ICD-10-PCS | Mod: PBBFAC,,, | Performed by: INTERNAL MEDICINE

## 2023-05-26 NOTE — PROGRESS NOTES
Subjective:    Patient ID:  Shan Warner is a 89 y.o. male who presents for follow-up of Aortic valve stenosis (3 month f/u )      HPI  Over the last 6 months he's got PPM, PCi of RCA and TAVR  He comes for follow up with no major problems, no chest pain, no shortness of breath.  Nevertheless, he continues having some tiredness fatigue mild shortness of breath with moderate activities.  No chest pain.  No syncope or near-syncope   Blood pressure normal at home      Review of Systems   Constitutional: Negative for decreased appetite, malaise/fatigue, weight gain and weight loss.   Cardiovascular:  Negative for chest pain, dyspnea on exertion, leg swelling, palpitations and syncope.   Respiratory:  Negative for cough and shortness of breath.    Gastrointestinal: Negative.    Neurological:  Negative for weakness.   All other systems reviewed and are negative.     Objective:      Physical Exam  Vitals and nursing note reviewed.   Constitutional:       Appearance: Normal appearance. He is well-developed.   HENT:      Head: Normocephalic.   Eyes:      Pupils: Pupils are equal, round, and reactive to light.   Neck:      Thyroid: No thyromegaly.      Vascular: No carotid bruit or JVD.   Cardiovascular:      Rate and Rhythm: Normal rate and regular rhythm.      Chest Wall: PMI is not displaced.      Pulses: Normal pulses and intact distal pulses.      Heart sounds: Murmur heard.   Harsh midsystolic murmur is present with a grade of 3/6 at the upper right sternal border radiating to the neck.     No gallop.   Pulmonary:      Effort: Pulmonary effort is normal.      Breath sounds: Normal breath sounds.   Abdominal:      Palpations: Abdomen is soft. There is no mass.      Tenderness: There is no abdominal tenderness.   Musculoskeletal:         General: Normal range of motion.      Cervical back: Normal range of motion and neck supple.   Skin:     General: Skin is warm.   Neurological:      Mental Status: He is alert and  oriented to person, place, and time.      Sensory: No sensory deficit.      Deep Tendon Reflexes: Reflexes are normal and symmetric.       Assessment:       1. S/P TAVR (transcatheter aortic valve replacement)    2. Coronary artery disease involving native coronary artery of native heart without angina pectoris    3. Essential hypertension    4. Pacemaker         Plan:   Baseline pacemaker rate increased to 70 beats per minute  Continue all cardiac medications  Regular exercise program  6 week follow-up

## 2023-06-27 ENCOUNTER — CLINICAL SUPPORT (OUTPATIENT)
Dept: CARDIOLOGY | Facility: HOSPITAL | Age: 88
End: 2023-06-27
Payer: OTHER GOVERNMENT

## 2023-06-27 ENCOUNTER — TELEPHONE (OUTPATIENT)
Dept: CARDIOLOGY | Facility: CLINIC | Age: 88
End: 2023-06-27

## 2023-06-27 DIAGNOSIS — Z95.0 PRESENCE OF CARDIAC PACEMAKER: ICD-10-CM

## 2023-06-27 PROCEDURE — 93296 REM INTERROG EVL PM/IDS: CPT | Mod: PO | Performed by: INTERNAL MEDICINE

## 2023-06-27 NOTE — TELEPHONE ENCOUNTER
----- Message from Juan Daniel Decker sent at 6/27/2023  8:17 AM CDT -----  Type: Needs Medical Advice  Who Called:  pt  Symptoms (please be specific):  pt wanted to know did he need to bring anything to the office when he come since it's a device check--please call and advise  Best Call Back Number: [phone] (home)     Additional Information: thank you

## 2023-08-03 ENCOUNTER — OFFICE VISIT (OUTPATIENT)
Dept: CARDIOLOGY | Facility: CLINIC | Age: 88
End: 2023-08-03
Payer: OTHER GOVERNMENT

## 2023-08-03 VITALS
BODY MASS INDEX: 19.78 KG/M2 | HEIGHT: 68 IN | SYSTOLIC BLOOD PRESSURE: 148 MMHG | HEART RATE: 69 BPM | WEIGHT: 130.5 LBS | DIASTOLIC BLOOD PRESSURE: 58 MMHG

## 2023-08-03 DIAGNOSIS — Z95.2 S/P TAVR (TRANSCATHETER AORTIC VALVE REPLACEMENT): Primary | ICD-10-CM

## 2023-08-03 DIAGNOSIS — Z95.0 PACEMAKER: ICD-10-CM

## 2023-08-03 DIAGNOSIS — I25.10 CORONARY ARTERY DISEASE INVOLVING NATIVE CORONARY ARTERY OF NATIVE HEART WITHOUT ANGINA PECTORIS: ICD-10-CM

## 2023-08-03 DIAGNOSIS — I10 ESSENTIAL HYPERTENSION: ICD-10-CM

## 2023-08-03 PROCEDURE — 99213 OFFICE O/P EST LOW 20 MIN: CPT | Mod: PBBFAC,PO | Performed by: INTERNAL MEDICINE

## 2023-08-03 PROCEDURE — 99999 PR PBB SHADOW E&M-EST. PATIENT-LVL III: CPT | Mod: PBBFAC,,, | Performed by: INTERNAL MEDICINE

## 2023-08-03 PROCEDURE — 99999 PR PBB SHADOW E&M-EST. PATIENT-LVL III: ICD-10-PCS | Mod: PBBFAC,,, | Performed by: INTERNAL MEDICINE

## 2023-08-03 PROCEDURE — 99214 PR OFFICE/OUTPT VISIT, EST, LEVL IV, 30-39 MIN: ICD-10-PCS | Mod: S$PBB,,, | Performed by: INTERNAL MEDICINE

## 2023-08-03 PROCEDURE — 99214 OFFICE O/P EST MOD 30 MIN: CPT | Mod: S$PBB,,, | Performed by: INTERNAL MEDICINE

## 2023-08-03 NOTE — PROGRESS NOTES
Subjective:    Patient ID:  Shan Warner is a 89 y.o. male who presents for follow-up of TAVR     HPI  He comes for follow up with no major problems, no chest pain, no shortness of breath.  Is slowly getting better.  Doing well at cardiac rehab.    No syncope.    Blood pressure normal at home    Review of Systems   Constitutional: Negative for decreased appetite, malaise/fatigue, weight gain and weight loss.   Cardiovascular:  Negative for chest pain, dyspnea on exertion, leg swelling, palpitations and syncope.   Respiratory:  Negative for cough and shortness of breath.    Gastrointestinal: Negative.    Neurological:  Negative for weakness.   All other systems reviewed and are negative.       Objective:      Physical Exam  Vitals and nursing note reviewed.   Constitutional:       Appearance: Normal appearance. He is well-developed.   HENT:      Head: Normocephalic.   Eyes:      Pupils: Pupils are equal, round, and reactive to light.   Neck:      Thyroid: No thyromegaly.      Vascular: No carotid bruit or JVD.   Cardiovascular:      Rate and Rhythm: Normal rate and regular rhythm.      Chest Wall: PMI is not displaced.      Pulses: Normal pulses and intact distal pulses.      Heart sounds: Murmur heard.      Harsh midsystolic murmur is present with a grade of 3/6 at the upper right sternal border radiating to the neck.      No gallop.   Pulmonary:      Effort: Pulmonary effort is normal.      Breath sounds: Normal breath sounds.   Abdominal:      Palpations: Abdomen is soft. There is no mass.      Tenderness: There is no abdominal tenderness.   Musculoskeletal:         General: Normal range of motion.      Cervical back: Normal range of motion and neck supple.   Skin:     General: Skin is warm.   Neurological:      Mental Status: He is alert and oriented to person, place, and time.      Sensory: No sensory deficit.      Deep Tendon Reflexes: Reflexes are normal and symmetric.             Most Recent EKG  Results  Results for orders placed or performed during the hospital encounter of 05/22/23   EKG 12-lead    Collection Time: 05/22/23 10:12 AM    Narrative    Test Reason : Z95.2,    Vent. Rate : 060 BPM     Atrial Rate : 060 BPM     P-R Int : 174 ms          QRS Dur : 176 ms      QT Int : 494 ms       P-R-T Axes : -29 -66 089 degrees     QTc Int : 494 ms    AV sequential or dual chamber electronic pacemaker  When compared with ECG of 20-APR-2023 07:19,  Vent. rate has decreased BY   8 BPM  Confirmed by Ramsey Lal MD ([id number]) on 5/22/2023 10:50:44 AM    Referred By: CHARO GONZALEZ           Confirmed By:Ramsey Lal MD       Most Recent Echocardiogram Results  Results for orders placed in visit on 05/19/23    Echo    Interpretation Summary  · The left ventricle is normal in size with normal systolic function.  · The estimated ejection fraction is 65%.  · Grade II left ventricular diastolic dysfunction.  · Normal right ventricular size with normal right ventricular systolic function.  · There is a transcutaneously-placed aortic bioprosthesis present. There is no aortic insufficiency present. Prosthetic aortic valve is normal.  · The aortic valve mean gradient is 9 mmHg with a dimensionless index of 0.60.  · Mild mitral regurgitation.  · Normal central venous pressure (3 mmHg).  · The estimated PA systolic pressure is 36 mmHg.      Most Recent Nuclear Stress Test Results  Results for orders placed during the hospital encounter of 05/04/22    Nuclear Stress - Cardiology Interpreted    Interpretation Summary    Abnormal myocardial perfusion scan.    There is a mild intensity, small sized, reversible perfusion abnormality that is consistent with ischemia in the mid to distal inferoseptal wall(s).    There are no other significant perfusion abnormalities.    The gated perfusion images showed an ejection fraction of 63% at rest.    There is normal wall motion at rest.    The EKG portion of this study is uninterpretable for  ischemia secondary to paced rhythm.    There are no prior studies for comparison.      Most Recent Cardiac PET Stress Test Results  No results found for this or any previous visit.      Most Recent Cardiovascular Angiogram results  Results for orders placed during the hospital encounter of 04/19/23    Cardiac catheterization    Conclusion  Procedures performed:  Bilateral common femoral artery access using ultrasound and radiographic guidance  Left common femoral vein access/Right internal jugular vein access  Temporary transvenous pacemaker implantation  Simultaneous Left heart and aortic pressure hemodynamics  TAVR using Medtronic FX  26 mm valve    Procedure:  Patient brought to coronary catheterization lab prepped and draped usual sterile fashion.  Anesthesia please see their note.  Bilateral common femoral artery access using modified Seldinger technique under radiographic and ultrasound guidance into which 6 [place]ian sheaths were placed.  Right common femoral vein access earlier by Anesthesia access via modified Seldinger technique under ultrasound radiographic guidance.  A temporary balloon tipped pacemaker catheter advanced  into the right ventricle, loss capture less than 1 ma.  Left common femoral artery sheath removed subsequently dilated using a 8, 10, 12, 14 Albanian sheath into which a cordis 16 Albanian sheath was placed.  Patient was then heparinized.  Six [place]ian pigtail catheter advanced up the left common femoral artery placed into the non coronary cusp of the aorta.  Aortic valve was crossed using AL1 catheter and a straight wire via the right common femoral artery.  A pigtail catheter placed in left ventricle and simultaneous hemodynamics performed in the left ventricle and aorta.  A Confida wire then placed in the left ventricle over the pigtail catheter which was then removed.  Medtronic valve was then images to ensure proper positioning on delivery catheter.  26  mm Medtronic Evolut FX valve then  advanced over the Confida wire placed into the left ventricle.  Position was verified a under transesophageal echocardiogram and radiographic guidance.  Rapid pacing performed at 180 beats per minute valve deployed, pacemaker discontinued.  Positioning verified by radiographic and ultrasound guidance with no aortic insufficiency.  Catheters removed.  Pigtail catheter passed back into the left ventricle for simultaneous hemodynamics of the left ventricle and aorta.  After procedure all catheters removed over a 0.35 wires.  A crossover catheter was brought from the left common femoral artery into the right common iliac artery  over a Glidewire which was placed down into the contralateral superficial femoral artery.  A 7 x 20 balloon was placed into the iliac artery for hemodynamic control and prevention of bleeding while sheath was removed.  Balloon was inflated to 4 atmospheres the 16 [place]ian sheath removed from the common femoral artery over a wire.  Hemostasis obtained via a manta device deployed without complication.  Hemostasis verified via angiogram.  Balloon was deflated and removed hemostasis obtained via the right common femoral artery via Perclose device.  The right common femoral vein hemostasis obtained via hand-held pressure.  Temporary transvenous pacemaker sutured in place.  Patient brought to recovery in stable condition.    Hemodynamics.  Pre valve replacement;  Left ventricular pressure 147/17/13 mmHg, aortic pressure  125/59/75 mmHg  Post valve replacement;  Left ventricular pressure 141/15/23mmHg, aortic pressure  139/51/87 mmHg.      Impression  Successful TAVR using a Medtronic FX   26 mm valve.    Plan:  Dual antiplatelet therapy minimum 6 months      Other Most Recent Cardiology Results  Results for orders placed in visit on 08/02/23    CARDIAC MONITORING STRIPS      Assessment:       1. S/P TAVR (transcatheter aortic valve replacement)    2. Pacemaker    3. Coronary artery disease involving  native coronary artery of native heart without angina pectoris    4. Essential hypertension         Plan:     Continue aspirin, Plavix, metoprolol  Regular exercise program    Six-month follow-up with Dasha Macias

## 2023-08-28 PROBLEM — K92.2 UPPER GI BLEED: Status: RESOLVED | Noted: 2022-06-09 | Resolved: 2023-08-28

## 2023-09-18 DIAGNOSIS — I25.10 CORONARY ARTERY DISEASE INVOLVING NATIVE CORONARY ARTERY OF NATIVE HEART WITHOUT ANGINA PECTORIS: ICD-10-CM

## 2023-09-18 DIAGNOSIS — R06.09 DOE (DYSPNEA ON EXERTION): ICD-10-CM

## 2023-09-18 RX ORDER — FUROSEMIDE 20 MG/1
20 TABLET ORAL EVERY OTHER DAY
Qty: 45 TABLET | Refills: 3 | Status: SHIPPED | OUTPATIENT
Start: 2023-09-18

## 2023-09-25 ENCOUNTER — CLINICAL SUPPORT (OUTPATIENT)
Dept: CARDIOLOGY | Facility: HOSPITAL | Age: 88
End: 2023-09-25
Payer: OTHER GOVERNMENT

## 2023-09-25 DIAGNOSIS — Z95.0 PRESENCE OF CARDIAC PACEMAKER: ICD-10-CM

## 2023-09-25 PROCEDURE — 93294 CARDIAC DEVICE CHECK - REMOTE: ICD-10-PCS | Mod: ,,, | Performed by: INTERNAL MEDICINE

## 2023-09-25 PROCEDURE — 93294 REM INTERROG EVL PM/LDLS PM: CPT | Mod: ,,, | Performed by: INTERNAL MEDICINE

## 2023-09-25 PROCEDURE — 93296 REM INTERROG EVL PM/IDS: CPT | Mod: PO | Performed by: INTERNAL MEDICINE

## 2023-10-02 ENCOUNTER — TELEPHONE (OUTPATIENT)
Dept: CARDIOLOGY | Facility: CLINIC | Age: 88
End: 2023-10-02
Payer: OTHER GOVERNMENT

## 2023-10-02 ENCOUNTER — PATIENT MESSAGE (OUTPATIENT)
Dept: CARDIOLOGY | Facility: CLINIC | Age: 88
End: 2023-10-02
Payer: OTHER GOVERNMENT

## 2023-10-02 NOTE — TELEPHONE ENCOUNTER
----- Message from Radhika Bruce sent at 10/2/2023 11:40 AM CDT -----  Regarding: advice  Contact: wife, Griselda Warner  Type: Needs Medical Advice  Who Called:  wife, Griselda Warner  Symptoms (please be specific):  lump with a little white cap on top with a line going toward the pacemaker painful  How long has patient had these symptoms:    Pharmacy name and phone #:    Best Call Back Number: [phone] (home)   Additional Information: Patient is concerned about the way it looks and is asking to be seen. Please call patient to schedule.Thanks!

## 2023-10-03 ENCOUNTER — HOSPITAL ENCOUNTER (OUTPATIENT)
Dept: RADIOLOGY | Facility: HOSPITAL | Age: 88
Discharge: HOME | End: 2023-10-03
Attending: INTERNAL MEDICINE
Payer: OTHER GOVERNMENT

## 2023-10-03 ENCOUNTER — CLINICAL SUPPORT (OUTPATIENT)
Dept: CARDIOLOGY | Facility: HOSPITAL | Age: 88
End: 2023-10-03
Attending: INTERNAL MEDICINE
Payer: OTHER GOVERNMENT

## 2023-10-03 DIAGNOSIS — I44.2 AV BLOCK, COMPLETE: ICD-10-CM

## 2023-10-03 DIAGNOSIS — Z95.0 PACEMAKER: ICD-10-CM

## 2023-10-03 DIAGNOSIS — Z95.0 PACEMAKER: Primary | ICD-10-CM

## 2023-10-03 PROCEDURE — 93280 PM DEVICE PROGR EVAL DUAL: CPT | Mod: 26,,, | Performed by: INTERNAL MEDICINE

## 2023-10-03 PROCEDURE — 93280 PM DEVICE PROGR EVAL DUAL: CPT | Mod: PO

## 2023-10-03 PROCEDURE — 71046 XR CHEST PA AND LATERAL: ICD-10-PCS | Mod: 26,,, | Performed by: RADIOLOGY

## 2023-10-03 PROCEDURE — 71046 X-RAY EXAM CHEST 2 VIEWS: CPT | Mod: TC,FY,PO

## 2023-10-03 PROCEDURE — 71046 X-RAY EXAM CHEST 2 VIEWS: CPT | Mod: 26,,, | Performed by: RADIOLOGY

## 2023-10-03 PROCEDURE — 93280 CARDIAC DEVICE CHECK - IN CLINIC & HOSPITAL: ICD-10-PCS | Mod: 26,,, | Performed by: INTERNAL MEDICINE

## 2023-10-03 NOTE — TELEPHONE ENCOUNTER
Pt. Seen in clinic assessed by Dr. Lal per Dr. Lal, device interrogation and chest x ray. Pt. States he was lifting weights last week and the bar hit his chest and has a red bump on corner of scar. See images below. Discussed signs and symptoms of infection and instructed pt. To call clinic if symptoms develop. Device function WNL.

## 2023-10-06 ENCOUNTER — PATIENT MESSAGE (OUTPATIENT)
Dept: CARDIOLOGY | Facility: CLINIC | Age: 88
End: 2023-10-06
Payer: OTHER GOVERNMENT

## 2023-11-21 ENCOUNTER — PATIENT MESSAGE (OUTPATIENT)
Dept: CARDIOLOGY | Facility: CLINIC | Age: 88
End: 2023-11-21
Payer: OTHER GOVERNMENT

## 2023-11-29 ENCOUNTER — TELEPHONE (OUTPATIENT)
Dept: CARDIOLOGY | Facility: CLINIC | Age: 88
End: 2023-11-29
Payer: OTHER GOVERNMENT

## 2023-11-29 NOTE — TELEPHONE ENCOUNTER
----- Message from Ester tSeen sent at 11/29/2023  3:34 PM CST -----  Contact: Grace (wife)  Type:  Needs Medical Advice    Who Called: Grace     Would the patient rather a call back or a response via [facility]? Call back    Best Call Back Number: [phone]    Additional Information: wants to make sure that the 12/24 appt is at the [address] address.    Please call to advise    Thanks

## 2023-12-04 ENCOUNTER — TELEPHONE (OUTPATIENT)
Dept: CARDIOLOGY | Facility: CLINIC | Age: 88
End: 2023-12-04
Payer: OTHER GOVERNMENT

## 2023-12-04 NOTE — TELEPHONE ENCOUNTER
----- Message from Charlene Posada sent at 12/4/2023  9:11 AM CST -----  Regarding: rt call  Type:  Patient Returning Call    Who Called: Wife Celia     Who Left Message for Patient:unknown    Does the patient know what this is regarding?:yes    Best Call Back Number:[phone]      Additional Information: wife st that pt right left is swollen & hot.  feels like dead weight pt can't walk or get out of bed. Please call to discuss.

## 2023-12-25 ENCOUNTER — CLINICAL SUPPORT (OUTPATIENT)
Dept: CARDIOLOGY | Facility: HOSPITAL | Age: 88
End: 2023-12-25
Attending: INTERNAL MEDICINE
Payer: OTHER GOVERNMENT

## 2023-12-25 ENCOUNTER — CLINICAL SUPPORT (OUTPATIENT)
Dept: CARDIOLOGY | Facility: HOSPITAL | Age: 88
End: 2023-12-25
Payer: OTHER GOVERNMENT

## 2023-12-25 PROCEDURE — 93296 REM INTERROG EVL PM/IDS: CPT | Mod: PO | Performed by: INTERNAL MEDICINE

## 2023-12-25 PROCEDURE — 93294 REM INTERROG EVL PM/LDLS PM: CPT | Mod: ,,, | Performed by: INTERNAL MEDICINE

## 2023-12-25 PROCEDURE — 93294 CARDIAC DEVICE CHECK - REMOTE: ICD-10-PCS | Mod: ,,, | Performed by: INTERNAL MEDICINE

## 2023-12-28 LAB
OHS CV AF BURDEN PERCENT: < 1
OHS CV DC REMOTE DEVICE TYPE: NORMAL
OHS CV RV PACING PERCENT: 100 %

## 2024-01-01 ENCOUNTER — OFFICE VISIT (OUTPATIENT)
Dept: CARDIOLOGY | Facility: CLINIC | Age: 89
End: 2024-01-01
Payer: MEDICARE

## 2024-01-01 ENCOUNTER — HOSPITAL ENCOUNTER (OUTPATIENT)
Dept: CARDIOLOGY | Facility: HOSPITAL | Age: 89
Discharge: HOME | End: 2024-03-26
Attending: INTERNAL MEDICINE
Payer: MEDICARE

## 2024-01-01 ENCOUNTER — HOSPITAL ENCOUNTER (OUTPATIENT)
Dept: CARDIOLOGY | Facility: HOSPITAL | Age: 89
Discharge: HOME | End: 2024-02-08
Attending: INTERNAL MEDICINE
Payer: OTHER GOVERNMENT

## 2024-01-01 ENCOUNTER — OFFICE VISIT (OUTPATIENT)
Dept: CARDIOLOGY | Facility: CLINIC | Age: 89
End: 2024-01-01
Payer: OTHER GOVERNMENT

## 2024-01-01 ENCOUNTER — CLINICAL SUPPORT (OUTPATIENT)
Dept: CARDIOLOGY | Facility: HOSPITAL | Age: 89
End: 2024-01-01
Payer: OTHER GOVERNMENT

## 2024-01-01 ENCOUNTER — PATIENT MESSAGE (OUTPATIENT)
Dept: CARDIOLOGY | Facility: CLINIC | Age: 89
End: 2024-01-01
Payer: OTHER GOVERNMENT

## 2024-01-01 VITALS
BODY MASS INDEX: 23.09 KG/M2 | WEIGHT: 130.31 LBS | HEART RATE: 70 BPM | SYSTOLIC BLOOD PRESSURE: 126 MMHG | HEIGHT: 63 IN | DIASTOLIC BLOOD PRESSURE: 59 MMHG

## 2024-01-01 VITALS
HEIGHT: 63 IN | SYSTOLIC BLOOD PRESSURE: 119 MMHG | BODY MASS INDEX: 23.28 KG/M2 | HEART RATE: 71 BPM | WEIGHT: 131.38 LBS | DIASTOLIC BLOOD PRESSURE: 55 MMHG

## 2024-01-01 VITALS — HEIGHT: 63 IN | WEIGHT: 130 LBS | BODY MASS INDEX: 23.04 KG/M2

## 2024-01-01 DIAGNOSIS — I25.10 CORONARY ARTERY DISEASE INVOLVING NATIVE CORONARY ARTERY OF NATIVE HEART WITHOUT ANGINA PECTORIS: ICD-10-CM

## 2024-01-01 DIAGNOSIS — Z95.2 S/P TAVR (TRANSCATHETER AORTIC VALVE REPLACEMENT): ICD-10-CM

## 2024-01-01 DIAGNOSIS — I10 ESSENTIAL HYPERTENSION: ICD-10-CM

## 2024-01-01 DIAGNOSIS — I35.0 SEVERE AORTIC STENOSIS: Primary | ICD-10-CM

## 2024-01-01 DIAGNOSIS — I35.0 SEVERE AORTIC STENOSIS: ICD-10-CM

## 2024-01-01 DIAGNOSIS — I44.2 AV BLOCK, COMPLETE: ICD-10-CM

## 2024-01-01 DIAGNOSIS — I50.30 NYHA CLASS 2 HEART FAILURE WITH PRESERVED EJECTION FRACTION: ICD-10-CM

## 2024-01-01 DIAGNOSIS — M79.89 RIGHT LEG SWELLING: ICD-10-CM

## 2024-01-01 LAB
ASCENDING AORTA: 2.11 CM
AV INDEX (PROSTH): 0.73
AV MEAN GRADIENT: 10 MMHG
AV PEAK GRADIENT: 18 MMHG
AV VALVE AREA BY VELOCITY RATIO: 2.25 CM²
AV VALVE AREA: 2.53 CM²
AV VELOCITY RATIO: 0.65
BSA FOR ECHO PROCEDURE: 1.62 M2
CV ECHO LV RWT: 0.51 CM
DOP CALC AO PEAK VEL: 2.15 M/S
DOP CALC AO VTI: 46.1 CM
DOP CALC LVOT AREA: 3.5 CM2
DOP CALC LVOT DIAMETER: 2.1 CM
DOP CALC LVOT PEAK VEL: 1.4 M/S
DOP CALC LVOT STROKE VOLUME: 116.66 CM3
DOP CALC MV VTI: 54.8 CM
DOP CALCLVOT PEAK VEL VTI: 33.7 CM
E WAVE DECELERATION TIME: 355.87 MSEC
E/A RATIO: 0.85
E/E' RATIO: 31.11 M/S
ECHO LV POSTERIOR WALL: 1.28 CM (ref 0.6–1.1)
FRACTIONAL SHORTENING: 33 % (ref 28–44)
HR MV ECHO: 70 BPM
INTERVENTRICULAR SEPTUM: 1.34 CM (ref 0.6–1.1)
IVRT: 110.37 MSEC
LEFT ANT TIBIAL SYS PSV: 168 CM/S
LEFT ATRIUM SIZE: 4.15 CM
LEFT ATRIUM VOLUME INDEX MOD: 43.2 ML/M2
LEFT ATRIUM VOLUME MOD: 69.6 CM3
LEFT CFA PSV: 196 CM/S
LEFT INTERNAL DIMENSION IN SYSTOLE: 3.37 CM (ref 2.1–4)
LEFT PERONEAL SYS PSV: 71 CM/S
LEFT POPLITEAL PSV: 103 CM/S
LEFT POST TIBIAL SYS PSV: 47 CM/S
LEFT PROFUNDA SYS PSV: 83 CM/S
LEFT SUPER FEMORAL DIST SYS PSV: 158 CM/S
LEFT SUPER FEMORAL MID SYS PSV: 128 CM/S
LEFT SUPER FEMORAL OSTIAL SYS PSV: 201 CM/S
LEFT SUPER FEMORAL PROX SYS PSV: 190 CM/S
LEFT TIB/PER TRUNK SYS PSV: 116 CM/S
LEFT VENTRICLE DIASTOLIC VOLUME INDEX: 75.05 ML/M2
LEFT VENTRICLE DIASTOLIC VOLUME: 120.83 ML
LEFT VENTRICLE MASS INDEX: 167 G/M2
LEFT VENTRICLE SYSTOLIC VOLUME INDEX: 28.7 ML/M2
LEFT VENTRICLE SYSTOLIC VOLUME: 46.28 ML
LEFT VENTRICULAR INTERNAL DIMENSION IN DIASTOLE: 5.05 CM (ref 3.5–6)
LEFT VENTRICULAR MASS: 268.86 G
LV LATERAL E/E' RATIO: 28 M/S
LV SEPTAL E/E' RATIO: 35 M/S
LVOT MG: 4.54 MMHG
LVOT MV: 1 CM/S
MV MEAN GRADIENT: 5 MMHG
MV PEAK A VEL: 1.64 M/S
MV PEAK E VEL: 1.4 M/S
MV PEAK GRADIENT: 12 MMHG
MV VALVE AREA BY CONTINUITY EQUATION: 2.13 CM2
OHS CV AF BURDEN PERCENT: < 1
OHS CV DC REMOTE DEVICE TYPE: NORMAL
OHS CV LEFT LOWER EXTREMITY ABI (NO CALC): 1.2
OHS CV RIGHT ABI LOWER EXTREMITY (NO CALC): 1.13
OHS CV RV PACING PERCENT: 100 %
PISA MRMAX VEL: 5.65 M/S
PISA TR MAX VEL: 2.71 M/S
PULM VEIN S/D RATIO: 1.39
PV PEAK D VEL: 0.51 M/S
PV PEAK S VEL: 0.71 M/S
RA PRESSURE ESTIMATED: 8 MMHG
RA VOL SYS: 40.95 ML
RIGHT ANT TIBIAL SYS PSV: 159 CM/S
RIGHT ATRIAL AREA: 14.6 CM2
RIGHT CFA PSV: 225 CM/S
RIGHT PERONEAL SYS PSV: 132 CM/S
RIGHT POPLITEAL PSV: 83 CM/S
RIGHT POST TIBIAL SYS PSV: 276 CM/S
RIGHT PROFUNDA SYS PSV: 146 CM/S
RIGHT SUPER FEMORAL DIST SYS PSV: 160 CM/S
RIGHT SUPER FEMORAL MID SYS PSV: 136 CM/S
RIGHT SUPER FEMORAL OSTIAL SYS PSV: 218 CM/S
RIGHT SUPER FEMORAL PROX SYS PSV: 182 CM/S
RIGHT TIB/PER TRUNK SYS PSV: 206 CM/S
RIGHT VENTRICULAR END-DIASTOLIC DIMENSION: 3.99 CM
RIGHT VENTRICULAR LENGTH IN DIASTOLE (APICAL 4-CHAMBER VIEW): 7.15 CM
RV MID DIAMA: 2.28 CM
RV TB RVSP: 11 MMHG
RV TISSUE DOPPLER FREE WALL SYSTOLIC VELOCITY 1 (APICAL 4 CHAMBER VIEW): 17.16 CM/S
SINUS: 1.9 CM
STJ: 1.89 CM
TDI LATERAL: 0.05 M/S
TDI SEPTAL: 0.04 M/S
TDI: 0.05 M/S
TR MAX PG: 29 MMHG
TRICUSPID ANNULAR PLANE SYSTOLIC EXCURSION: 1.98 CM
TV REST PULMONARY ARTERY PRESSURE: 37 MMHG
Z-SCORE OF LEFT VENTRICULAR DIMENSION IN END DIASTOLE: 1
Z-SCORE OF LEFT VENTRICULAR DIMENSION IN END SYSTOLE: 1.37

## 2024-01-01 PROCEDURE — 99214 OFFICE O/P EST MOD 30 MIN: CPT | Mod: S$PBB,,, | Performed by: INTERNAL MEDICINE

## 2024-01-01 PROCEDURE — 93306 TTE W/DOPPLER COMPLETE: CPT | Mod: PO

## 2024-01-01 PROCEDURE — 93296 REM INTERROG EVL PM/IDS: CPT | Mod: PO | Performed by: INTERNAL MEDICINE

## 2024-01-01 PROCEDURE — 99213 OFFICE O/P EST LOW 20 MIN: CPT | Mod: PBBFAC,PO | Performed by: INTERNAL MEDICINE

## 2024-01-01 PROCEDURE — 93925 LOWER EXTREMITY STUDY: CPT | Mod: PO

## 2024-01-01 PROCEDURE — 93294 REM INTERROG EVL PM/LDLS PM: CPT | Mod: ,,, | Performed by: INTERNAL MEDICINE

## 2024-01-01 PROCEDURE — 93925 LOWER EXTREMITY STUDY: CPT | Mod: 26,,, | Performed by: INTERNAL MEDICINE

## 2024-01-01 PROCEDURE — 99215 OFFICE O/P EST HI 40 MIN: CPT | Mod: PBBFAC,PO | Performed by: INTERNAL MEDICINE

## 2024-01-01 PROCEDURE — 99999 PR PBB SHADOW E&M-EST. PATIENT-LVL III: CPT | Mod: PBBFAC,,, | Performed by: INTERNAL MEDICINE

## 2024-01-01 PROCEDURE — 93306 TTE W/DOPPLER COMPLETE: CPT | Mod: 26,,, | Performed by: INTERNAL MEDICINE

## 2024-01-01 PROCEDURE — 99214 OFFICE O/P EST MOD 30 MIN: CPT | Mod: S$GLB,,, | Performed by: INTERNAL MEDICINE

## 2024-01-01 PROCEDURE — 99999 PR PBB SHADOW E&M-EST. PATIENT-LVL V: CPT | Mod: PBBFAC,,, | Performed by: INTERNAL MEDICINE

## 2024-02-05 NOTE — PROGRESS NOTES
Subjective:    Patient ID:  Shan Warner is a 89 y.o. male who presents for follow-up of CAD, TAVR, pacemaker    HPI  He comes for follow up with of the right lower extremity for the last several weeks.    He has had 2 ultrasounds revealing no evidence of DVT in right lower extremity.  Blood work has shown an increase WBC count with a low hemoglobin.    He denies chest pain shortness of breath syncope or near-syncope.    No fever or chills either    Review of Systems   Constitutional: Negative for decreased appetite, malaise/fatigue, weight gain and weight loss.   Cardiovascular:  Negative for chest pain, dyspnea on exertion, leg swelling, palpitations and syncope.   Respiratory:  Negative for cough and shortness of breath.    Gastrointestinal: Negative.    Neurological:  Negative for weakness.   All other systems reviewed and are negative.     Objective:      Physical Exam  Vitals and nursing note reviewed.   Constitutional:       Appearance: Normal appearance. He is well-developed.   HENT:      Head: Normocephalic.   Eyes:      Pupils: Pupils are equal, round, and reactive to light.   Neck:      Thyroid: No thyromegaly.      Vascular: No carotid bruit or JVD.   Cardiovascular:      Rate and Rhythm: Normal rate and regular rhythm.      Chest Wall: PMI is not displaced.      Pulses: Normal pulses and intact distal pulses.      Heart sounds: Murmur heard.      Harsh midsystolic murmur is present with a grade of 3/6 at the upper right sternal border radiating to the neck.      No gallop.   Pulmonary:      Effort: Pulmonary effort is normal.      Breath sounds: Normal breath sounds.   Abdominal:      Palpations: Abdomen is soft. There is no mass.      Tenderness: There is no abdominal tenderness.   Musculoskeletal:         General: Normal range of motion.      Cervical back: Normal range of motion and neck supple.   Skin:     General: Skin is warm.   Neurological:      Mental Status: He is alert and oriented to  person, place, and time.      Sensory: No sensory deficit.      Deep Tendon Reflexes: Reflexes are normal and symmetric.           Most Recent EKG Results  Results for orders placed or performed during the hospital encounter of 05/22/23   EKG 12-lead    Collection Time: 05/22/23 10:12 AM    Narrative    Test Reason : Z95.2,    Vent. Rate : 060 BPM     Atrial Rate : 060 BPM     P-R Int : 174 ms          QRS Dur : 176 ms      QT Int : 494 ms       P-R-T Axes : -29 -66 089 degrees     QTc Int : 494 ms    AV sequential or dual chamber electronic pacemaker  When compared with ECG of 20-APR-2023 07:19,  Vent. rate has decreased BY   8 BPM  Confirmed by Ramsey Lal MD ([id number]) on 5/22/2023 10:50:44 AM    Referred By: CHARO GONZALEZ           Confirmed By:Ramsey Lal MD       Most Recent Echocardiogram Results  Results for orders placed in visit on 05/19/23    Echo    Interpretation Summary  · The left ventricle is normal in size with normal systolic function.  · The estimated ejection fraction is 65%.  · Grade II left ventricular diastolic dysfunction.  · Normal right ventricular size with normal right ventricular systolic function.  · There is a transcutaneously-placed aortic bioprosthesis present. There is no aortic insufficiency present. Prosthetic aortic valve is normal.  · The aortic valve mean gradient is 9 mmHg with a dimensionless index of 0.60.  · Mild mitral regurgitation.  · Normal central venous pressure (3 mmHg).  · The estimated PA systolic pressure is 36 mmHg.      Most Recent Nuclear Stress Test Results  Results for orders placed during the hospital encounter of 05/04/22    Nuclear Stress - Cardiology Interpreted    Interpretation Summary    Abnormal myocardial perfusion scan.    There is a mild intensity, small sized, reversible perfusion abnormality that is consistent with ischemia in the mid to distal inferoseptal wall(s).    There are no other significant perfusion abnormalities.    The gated perfusion  images showed an ejection fraction of 63% at rest.    There is normal wall motion at rest.    The EKG portion of this study is uninterpretable for ischemia secondary to paced rhythm.    There are no prior studies for comparison.      Most Recent Cardiac PET Stress Test Results  No results found for this or any previous visit.      Most Recent Cardiovascular Angiogram results  Results for orders placed during the hospital encounter of 04/19/23    Cardiac catheterization    Conclusion  Procedures performed:  Bilateral common femoral artery access using ultrasound and radiographic guidance  Left common femoral vein access/Right internal jugular vein access  Temporary transvenous pacemaker implantation  Simultaneous Left heart and aortic pressure hemodynamics  TAVR using Medtronic FX  26 mm valve    Procedure:  Patient brought to coronary catheterization lab prepped and draped usual sterile fashion.  Anesthesia please see their note.  Bilateral common femoral artery access using modified Seldinger technique under radiographic and ultrasound guidance into which 6 Tristanian sheaths were placed.  Right common femoral vein access earlier by Anesthesia access via modified Seldinger technique under ultrasound radiographic guidance.  A temporary balloon tipped pacemaker catheter advanced  into the right ventricle, loss capture less than 1 ma.  Left common femoral artery sheath removed subsequently dilated using a 8, 10, 12, 14 Tristanian sheath into which a cordis 16 Tristanian sheath was placed.  Patient was then heparinized.  Six Tristanian pigtail catheter advanced up the left common femoral artery placed into the non coronary cusp of the aorta.  Aortic valve was crossed using AL1 catheter and a straight wire via the right common femoral artery.  A pigtail catheter placed in left ventricle and simultaneous hemodynamics performed in the left ventricle and aorta.  A Confida wire then placed in the left ventricle over the pigtail catheter  which was then removed.  Medtronic valve was then images to ensure proper positioning on delivery catheter.  26  mm Medtronic Evolut FX valve then advanced over the Confida wire placed into the left ventricle.  Position was verified a under transesophageal echocardiogram and radiographic guidance.  Rapid pacing performed at 180 beats per minute valve deployed, pacemaker discontinued.  Positioning verified by radiographic and ultrasound guidance with no aortic insufficiency.  Catheters removed.  Pigtail catheter passed back into the left ventricle for simultaneous hemodynamics of the left ventricle and aorta.  After procedure all catheters removed over a 0.35 wires.  A crossover catheter was brought from the left common femoral artery into the right common iliac artery  over a Glidewire which was placed down into the contralateral superficial femoral artery.  A 7 x 20 balloon was placed into the iliac artery for hemodynamic control and prevention of bleeding while sheath was removed.  Balloon was inflated to 4 atmospheres the 16 [place] sheath removed from the common femoral artery over a wire.  Hemostasis obtained via a manta device deployed without complication.  Hemostasis verified via angiogram.  Balloon was deflated and removed hemostasis obtained via the right common femoral artery via Perclose device.  The right common femoral vein hemostasis obtained via hand-held pressure.  Temporary transvenous pacemaker sutured in place.  Patient brought to recovery in stable condition.    Hemodynamics.  Pre valve replacement;  Left ventricular pressure 147/17/13 mmHg, aortic pressure  125/59/75 mmHg  Post valve replacement;  Left ventricular pressure 141/15/23mmHg, aortic pressure  139/51/87 mmHg.      Impression  Successful TAVR using a Medtronic FX   26 mm valve.    Plan:  Dual antiplatelet therapy minimum 6 months      Other Most Recent Cardiology Results  Results for orders placed in visit on 12/25/23    Cardiac device  check - Remote      Labs reviewed    Assessment:       1. Severe aortic stenosis    2. Coronary artery disease involving native coronary artery of native heart without angina pectoris    3. AV block, complete    4. NYHA class 2 heart failure with preserved ejection fraction    5. Essential hypertension         Plan:   Check echocardiogram.  Call with results  Continue:  Statin    Follow-up in six-month with Dasha Macias

## 2024-02-07 PROBLEM — M79.89 RIGHT LEG SWELLING: Status: ACTIVE | Noted: 2024-02-07

## 2024-04-12 PROBLEM — G93.5 BRAIN COMPRESSION: Status: ACTIVE | Noted: 2024-01-01

## 2024-04-12 PROBLEM — Z86.59 HISTORY OF DEMENTIA: Status: ACTIVE | Noted: 2024-01-01

## 2024-04-12 PROBLEM — R47.81 SLURRED SPEECH: Status: ACTIVE | Noted: 2024-01-01

## 2024-04-12 PROBLEM — G93.6 CEREBRAL EDEMA: Status: ACTIVE | Noted: 2024-01-01

## 2024-04-12 PROBLEM — S06.5XAA SDH (SUBDURAL HEMATOMA): Status: ACTIVE | Noted: 2024-01-01

## 2024-04-12 PROBLEM — C95.90 LEUKEMIA: Status: ACTIVE | Noted: 2024-01-01

## 2024-04-13 PROBLEM — Z51.5 COMFORT MEASURES ONLY STATUS: Status: ACTIVE | Noted: 2024-01-01
